# Patient Record
Sex: MALE | Race: WHITE | NOT HISPANIC OR LATINO | Employment: OTHER | URBAN - METROPOLITAN AREA
[De-identification: names, ages, dates, MRNs, and addresses within clinical notes are randomized per-mention and may not be internally consistent; named-entity substitution may affect disease eponyms.]

---

## 2017-06-16 ENCOUNTER — TRANSCRIBE ORDERS (OUTPATIENT)
Dept: ADMINISTRATIVE | Facility: HOSPITAL | Age: 72
End: 2017-06-16

## 2017-06-16 DIAGNOSIS — Z87.891 PERSONAL HISTORY OF TOBACCO USE, PRESENTING HAZARDS TO HEALTH: Primary | ICD-10-CM

## 2017-06-22 ENCOUNTER — HOSPITAL ENCOUNTER (OUTPATIENT)
Dept: ULTRASOUND IMAGING | Facility: HOSPITAL | Age: 72
Discharge: HOME | End: 2017-06-22
Attending: INTERNAL MEDICINE | Admitting: INTERNAL MEDICINE

## 2017-06-22 DIAGNOSIS — Z87.891 PERSONAL HISTORY OF TOBACCO USE, PRESENTING HAZARDS TO HEALTH: ICD-10-CM

## 2017-06-22 PROCEDURE — 76706 US ABDL AORTA SCREEN AAA: CPT

## 2018-04-11 ENCOUNTER — CONSULT (OUTPATIENT)
Dept: ONCOLOGY | Facility: CLINIC | Age: 73
End: 2018-04-11

## 2018-04-11 VITALS
WEIGHT: 171.3 LBS | SYSTOLIC BLOOD PRESSURE: 118 MMHG | TEMPERATURE: 98.4 F | HEART RATE: 72 BPM | RESPIRATION RATE: 18 BRPM | OXYGEN SATURATION: 97 % | DIASTOLIC BLOOD PRESSURE: 77 MMHG | HEIGHT: 73 IN | BODY MASS INDEX: 22.7 KG/M2

## 2018-04-11 DIAGNOSIS — C15.9 ESOPHAGEAL CANCER, STAGE IV (HCC): Primary | ICD-10-CM

## 2018-04-11 PROCEDURE — 99205 OFFICE O/P NEW HI 60 MIN: CPT | Performed by: INTERNAL MEDICINE

## 2018-04-11 RX ORDER — TAMSULOSIN HYDROCHLORIDE 0.4 MG/1
1 CAPSULE ORAL NIGHTLY
COMMUNITY

## 2018-04-11 RX ORDER — PANTOPRAZOLE SODIUM 40 MG/1
40 TABLET, DELAYED RELEASE ORAL DAILY
Status: ON HOLD | COMMUNITY
End: 2018-06-07

## 2018-04-11 RX ORDER — RANITIDINE 150 MG/1
150 TABLET ORAL DAILY
COMMUNITY
End: 2018-06-07 | Stop reason: HOSPADM

## 2018-04-12 RX ORDER — SUCRALFATE ORAL 1 G/10ML
1 SUSPENSION ORAL 4 TIMES DAILY
Qty: 420 ML | Refills: 5 | Status: SHIPPED | OUTPATIENT
Start: 2018-04-12

## 2018-04-12 NOTE — PROGRESS NOTES
ID: 72 y.o. year old male from [facility] [id number]    PCP: Troy Collier MD    REFERRING PHYSICIAN: Dr. Collier    Reason for Consultation: Metastatic Esophageal Adenocarcinoma    Dear Dr. Collier    It is a pleasure to meet Mr. Benitez today.  As you know he is a very unfortunate gentleman who was diagnosed with metastatic esophageal cancer who presents today for consultation for therapy going forward.  He was initially diagnosed by Dr. Beck in October 2017.  The disease was not clinically metastatic at that time.  He initially was seen at [facility] and subsequently decided to pursue treatment at [facility] in [facility].  He was treated with neoadjuvant 5-FU and oxaliplatin along with proton therapy.  He underwent surgical exploration in March 2018 with a plan of resecting him.  Unfortunately at the time of surgery he had implants on the diaphragm consistent with metastatic disease.  At this point they referred him to oncology locally to consider palliative treatment going forward.  Patient is still recovering from his surgery and clinically seems to be doing reasonably well.  He is relatively asymptomatic.  He is able to eat.  Though he has periods of spasms at times.  He denies any significant weight loss recently.  Denies any significant pain issues.  Denies any headaches.  Her primary surgery CAT scan was negative for metastatic disease.      Past Medical History:   Diagnosis Date   • Kidney stones        Past Surgical History:   Procedure Laterality Date   • CATARACT EXTRACTION Bilateral     DR BULMARO MENDOZA   • KNEE SURGERY Left     [facility]   • SHOULDER SURGERY Bilateral     DR REJI MENDOZA       Social History     Social History   • Marital status:      Social History Main Topics   • Smoking status: Former Smoker     Quit date: 1980   • Smokeless tobacco: Never Used   • Alcohol use No   • Drug use: No   • Sexual activity: Defer     Other Topics Concern   • Not on file        Family History   Problem Relation Age of Onset   • Stroke Mother    • Heart attack Father    • Heart attack Sister        Review of Systems:    16 point review of systems was performed and reviewed and scanned into the EMR      Current Outpatient Prescriptions:   •  aspirin 81 MG tablet, Take 81 mg by mouth Daily., Disp: , Rfl:   •  pantoprazole (PROTONIX) 40 MG EC tablet, Take 40 mg by mouth Daily., Disp: , Rfl:   •  raNITIdine (ZANTAC) 150 MG tablet, Take 150 mg by mouth Daily., Disp: , Rfl:   •  tamsulosin (FLOMAX) 0.4 MG capsule 24 hr capsule, Take 1 capsule by mouth Every Night., Disp: , Rfl:     No Known Allergies    ECOG SCORE: 1    Objective     Vitals:    04/11/18 1344   BP: 118/77   Pulse: 72   Resp: 18   Temp: 98.4 °F (36.9 °C)   SpO2: 97%       Physical Exam    General: well appearing, in no acute distress  HEENT: sclera anicteric, oropharynx clear, neck is supple  Lymphatics: no cervical, supraclavicular, or axillary adenopathy  Cardiovascular: regular rate and rhythm, no murmurs, rubs or gallops  Lungs: clear to auscultation bilaterally  Abdomen: soft, nontender, nondistended.  No palpable organomegaly, g-tube in place  Extremities: no lower extremity edema  Skin: no rashes, lesions, bruising, or petechiae  Msk:  Shows no weakness of the large muscle groups  Psych: Mood is stable             ASSESSMENT:    72-year-old gentleman with metastatic adenocarcinoma of the esophagus which appears to be HER-2 positive on FISH.  I spoke to Mr. Benitez and his wife today at length regarding the prognosis of this disease.  He is actually a reasonable candidate for treatment.  I would consider Taxol with Herceptin as my choice of therapy.  This usually is well tolerated with limited side effects.  The other option is 5-FU with CPT-11 & Cyramza however this can be relatively toxic.  These were some of the options that were also considered  [facility].  However the goal of therapy is to palliate the  patient and currently the patient is relatively asymptomatic.  He is also considering foregoing all treatments and seeing how he does going forward.  I have recommended rechecking CAT scan in 1 month and if he has measurable disease then considering treatment with Taxol and Herceptin.  Obviously this is at the discretion of the patient and if he decides to forego therapy nothing that would be a;lso reasonable.  I have encountered some patients who have had an extended remission when they were HER-2 positive.  However that is fairly small number.  Obviously we will weigh the pros and cons of undergoing treatment when we consider starting it.  I will see him back in my clinic in 1 month after CAT scans are performed.  I would not start him on any treatment unless he has measurable disease.      Thank you for allowing me to participate in the care of this patient.    Yours sincerely,    Mirta Tenorio MD  [facility]  Hematology and Oncology        Please note that portions of this note may have been completed with a voice recognition program. Efforts were made to edit the dictations, but occasionally words are mistranscribed.

## 2018-04-12 NOTE — TELEPHONE ENCOUNTER
Carafate called into his pharmacy. I called the pharmacy to see if PA was required. It is not, however co-pay is $60. I called the patient back to make sure this was ok with him. He said he is willing to try it and see if it helps.

## 2018-05-09 ENCOUNTER — HOSPITAL ENCOUNTER (OUTPATIENT)
Dept: CT IMAGING | Facility: HOSPITAL | Age: 73
Discharge: HOME | End: 2018-05-09
Attending: INTERNAL MEDICINE | Admitting: INTERNAL MEDICINE

## 2018-05-09 ENCOUNTER — OFFICE VISIT (OUTPATIENT)
Dept: ONCOLOGY | Facility: CLINIC | Age: 73
End: 2018-05-09

## 2018-05-09 VITALS
OXYGEN SATURATION: 96 % | TEMPERATURE: 98.4 F | HEIGHT: 73 IN | DIASTOLIC BLOOD PRESSURE: 92 MMHG | BODY MASS INDEX: 22.82 KG/M2 | SYSTOLIC BLOOD PRESSURE: 170 MMHG | WEIGHT: 172.2 LBS | RESPIRATION RATE: 18 BRPM | HEART RATE: 72 BPM

## 2018-05-09 DIAGNOSIS — C15.9 ESOPHAGEAL CANCER, STAGE IV (HCC): ICD-10-CM

## 2018-05-09 DIAGNOSIS — C15.9 ESOPHAGEAL CANCER, STAGE IV (HCC): Primary | ICD-10-CM

## 2018-05-09 PROCEDURE — 25010000002 IOPAMIDOL 61 % SOLUTION: Performed by: INTERNAL MEDICINE

## 2018-05-09 PROCEDURE — 74177 CT ABD & PELVIS W/CONTRAST: CPT

## 2018-05-09 PROCEDURE — 63710000001 BARIUM 2 % SUSPENSION: Performed by: INTERNAL MEDICINE

## 2018-05-09 PROCEDURE — 82565 ASSAY OF CREATININE: CPT

## 2018-05-09 PROCEDURE — A9270 NON-COVERED ITEM OR SERVICE: HCPCS | Performed by: INTERNAL MEDICINE

## 2018-05-09 PROCEDURE — 71260 CT THORAX DX C+: CPT

## 2018-05-09 PROCEDURE — 99214 OFFICE O/P EST MOD 30 MIN: CPT | Performed by: INTERNAL MEDICINE

## 2018-05-09 RX ORDER — SENNA PLUS 8.6 MG/1
2 TABLET ORAL EVERY EVENING
COMMUNITY

## 2018-05-09 RX ORDER — ALPRAZOLAM 0.5 MG/1
0.5 TABLET ORAL DAILY
COMMUNITY

## 2018-05-09 RX ADMIN — IOPAMIDOL 90 ML: 612 INJECTION, SOLUTION INTRAVENOUS at 13:24

## 2018-05-09 RX ADMIN — BARIUM SULFATE 450 ML: 21 SUSPENSION ORAL at 12:15

## 2018-05-10 LAB — CREAT BLDA-MCNC: 1.4 MG/DL (ref 0.6–1.3)

## 2018-05-10 NOTE — PROGRESS NOTES
"PROBLEM LIST:    1.  Metastatic adenocarcinoma of the esophagus: Patient was treated with chemotherapy and radiation concurrently at [facility] in December 2017.  2.  Post neoadjuvant therapy esophagectomy was aborted due to metastases noted in the diaphragm.  3.  Dysphasia secondary to likely stricture versus recurrent disease  4.  HER-2 positive by fish      REASON FOR VISIT: The management of my esophageal cancer     HISTORY OF PRESENT ILLNESS:   72 y.o.  male presents today for follow-up of his esophageal cancer.  Since I last saw him clinically he is doing reasonably well.  Unfortunately he still has a fair bit of dysphagia related to narrowing at the distal esophagus.  He presents today after having CAT scans performed.  He still evaluating if he wants to consider further treatment with chemotherapy and not.  Denies any other issues ongoing.  He has occasional pain.  He is fairly functional.    Past medical history, social history and family history was reviewed and unchanged from prior visit.    Review of Systems:    Review of Systems - Oncology   A comprehensive 14 point review of systems was performed and was negative except as mentioned.      Medications:  The current medication list was reviewed in the EMR    ALLERGIES:  No Known Allergies      Physical Exam    VITAL SIGNS:  /92   Pulse 72   Temp 98.4 °F (36.9 °C) (Oral)   Resp 18   Ht 185.4 cm (72.99\")   Wt 78.1 kg (172 lb 3.2 oz)   SpO2 96%   BMI 22.72 kg/m²     Wt Readings from Last 3 Encounters:   05/09/18 78.1 kg (172 lb 3.2 oz)   04/11/18 77.7 kg (171 lb 4.8 oz)        Performance Status: 0    General: well appearing, in no acute distress  HEENT: sclera anicteric, oropharynx clear, neck is supple  Lymphatics: no cervical, supraclavicular, or axillary adenopathy  Cardiovascular: regular rate and rhythm, no murmurs, rubs or gallops  Lungs: clear to auscultation bilaterally  Abdomen: soft, nontender, nondistended.  No palpable " organomegaly, G-Tube in place  Extremities: no lower extremity edema  Skin: no rashes, lesions, bruising, or petechiae  Msk:  Shows no weakness of the large muscle groups  Psych: Mood is stable    Ct Chest With Contrast    Result Date: 5/9/2018  There is thickening of the distal third of the esophagus with luminal narrowing. There is no evidence of mediastinal adenopathy, pericardial/pleural effusion. There are changes in the lung bases which are thought to be chronic postinflammatory changes.  D:  05/09/2018 E:  05/09/2018    This report was finalized on 5/9/2018 2:20 PM by Dr. John Pang MD.      Ct Abdomen Pelvis With Contrast    Result Date: 5/9/2018  There are no malignant findings noted in the abdomen or pelvis. There is a nonspecific moderate amount of pelvic fluid. There is pancolonic diverticulosis and a gastrostomy tube is noted.  D:  05/09/2018 E:  05/09/2018  This report was finalized on 5/9/2018 2:56 PM by Dr. John Pang MD.          Assessment/Plan    1.  Metastatic HER-2 positive esophageal cancer: I reviewed the CAT scans with him and his wife today.  He is still considering if he wants to undergo chemotherapy.  I would treat him with Taxol and Herceptin.    2.  Dysphagia secondary to a distal stricture versus recurrence: I will ask Dr. Beck to see if he can repeat the EGD to consider dilation.    I will see him back in my clinic in 1 month to see how he is doing.    I spent 25 minutes on the patient's plan and care with more than 50% of the time spent counseling the patient.        Mirta Tenorio MD  [facility] Hematology and Oncology    5/10/2018         Please note that portions of this note may have been completed with a voice recognition program. Efforts were made to edit the dictations, but occasionally words are mistranscribed.

## 2018-05-18 ENCOUNTER — TELEPHONE (OUTPATIENT)
Dept: ONCOLOGY | Facility: CLINIC | Age: 73
End: 2018-05-18

## 2018-05-18 NOTE — TELEPHONE ENCOUNTER
----- Message from Lissett Wall sent at 5/18/2018 10:37 AM EDT -----  Regarding: EVI-STINT  Contact: [phone]  Patient called he was referred to Dr. Gong to get a stint, but Dr. Gong doesn't do stints never has got him prepped and everything and when he walked in he said I don't do stints so he referred him to a doctor [facility] Dr. García yesterday and he did the stint. He wanted Dr. Joaquin to know this.

## 2018-05-24 ENCOUNTER — TELEPHONE (OUTPATIENT)
Dept: ONCOLOGY | Facility: CLINIC | Age: 73
End: 2018-05-24

## 2018-05-24 NOTE — TELEPHONE ENCOUNTER
----- Message from Lissett Wall sent at 5/24/2018  1:58 PM EDT -----  Regarding: EVI-HAVING PAIN  Contact: [phone]  Patient called and he is having pain and Dr. Joaquin told him to call if he started having any pain.

## 2018-05-24 NOTE — TELEPHONE ENCOUNTER
Returned call to patient, he reported that he was having intermittent pain in right side dull to sharp pain.  Patient reported that he has hydrocodone elixar for pain but only take once to twice daily.  Patient reports that he just takes when he absolutely needs it and he is concerned about constipation.  Educated on pain management and management of constipation.  Patient verbalized understanding.

## 2018-06-04 ENCOUNTER — APPOINTMENT (OUTPATIENT)
Dept: CT IMAGING | Facility: HOSPITAL | Age: 73
End: 2018-06-04

## 2018-06-04 ENCOUNTER — HOSPITAL ENCOUNTER (INPATIENT)
Facility: HOSPITAL | Age: 73
LOS: 3 days | Discharge: HOME | End: 2018-06-07
Attending: EMERGENCY MEDICINE | Admitting: INTERNAL MEDICINE

## 2018-06-04 ENCOUNTER — APPOINTMENT (OUTPATIENT)
Dept: GENERAL RADIOLOGY | Facility: HOSPITAL | Age: 73
End: 2018-06-04

## 2018-06-04 DIAGNOSIS — C15.9 MALIGNANT NEOPLASM OF ESOPHAGUS, UNSPECIFIED LOCATION (HCC): Primary | ICD-10-CM

## 2018-06-04 DIAGNOSIS — R07.9 CHEST PAIN, UNSPECIFIED TYPE: ICD-10-CM

## 2018-06-04 PROBLEM — K21.9 GERD (GASTROESOPHAGEAL REFLUX DISEASE): Status: ACTIVE | Noted: 2018-06-04

## 2018-06-04 PROBLEM — R13.10 DYSPHAGIA: Status: ACTIVE | Noted: 2018-06-04

## 2018-06-04 LAB
ALBUMIN SERPL-MCNC: 4.2 G/DL (ref 3.2–4.8)
ALBUMIN/GLOB SERPL: 1.4 G/DL (ref 1.5–2.5)
ALP SERPL-CCNC: 94 U/L (ref 25–100)
ALT SERPL W P-5'-P-CCNC: 10 U/L (ref 7–40)
ANION GAP SERPL CALCULATED.3IONS-SCNC: 13 MMOL/L (ref 3–11)
AST SERPL-CCNC: 13 U/L (ref 0–33)
BASOPHILS # BLD AUTO: 0.02 10*3/MM3 (ref 0–0.2)
BASOPHILS NFR BLD AUTO: 0.2 % (ref 0–1)
BILIRUB SERPL-MCNC: 1 MG/DL (ref 0.3–1.2)
BNP SERPL-MCNC: 61 PG/ML (ref 0–100)
BUN BLD-MCNC: 18 MG/DL (ref 9–23)
BUN/CREAT SERPL: 13.8 (ref 7–25)
CALCIUM SPEC-SCNC: 9.6 MG/DL (ref 8.7–10.4)
CHLORIDE SERPL-SCNC: 102 MMOL/L (ref 99–109)
CO2 SERPL-SCNC: 24 MMOL/L (ref 20–31)
CREAT BLD-MCNC: 1.3 MG/DL (ref 0.6–1.3)
DEPRECATED RDW RBC AUTO: 40.9 FL (ref 37–54)
EOSINOPHIL # BLD AUTO: 0.17 10*3/MM3 (ref 0–0.3)
EOSINOPHIL NFR BLD AUTO: 1.8 % (ref 0–3)
ERYTHROCYTE [DISTWIDTH] IN BLOOD BY AUTOMATED COUNT: 13.6 % (ref 11.3–14.5)
GFR SERPL CREATININE-BSD FRML MDRD: 54 ML/MIN/1.73
GLOBULIN UR ELPH-MCNC: 2.9 GM/DL
GLUCOSE BLD-MCNC: 147 MG/DL (ref 70–100)
HCT VFR BLD AUTO: 42.6 % (ref 38.9–50.9)
HGB BLD-MCNC: 14.6 G/DL (ref 13.1–17.5)
HOLD SPECIMEN: NORMAL
HOLD SPECIMEN: NORMAL
IMM GRANULOCYTES # BLD: 0.02 10*3/MM3 (ref 0–0.03)
IMM GRANULOCYTES NFR BLD: 0.2 % (ref 0–0.6)
LIPASE SERPL-CCNC: 24 U/L (ref 6–51)
LYMPHOCYTES # BLD AUTO: 0.71 10*3/MM3 (ref 0.6–4.8)
LYMPHOCYTES NFR BLD AUTO: 7.7 % (ref 24–44)
MCH RBC QN AUTO: 28.9 PG (ref 27–31)
MCHC RBC AUTO-ENTMCNC: 34.3 G/DL (ref 32–36)
MCV RBC AUTO: 84.4 FL (ref 80–99)
MONOCYTES # BLD AUTO: 0.76 10*3/MM3 (ref 0–1)
MONOCYTES NFR BLD AUTO: 8.3 % (ref 0–12)
NEUTROPHILS # BLD AUTO: 7.54 10*3/MM3 (ref 1.5–8.3)
NEUTROPHILS NFR BLD AUTO: 82 % (ref 41–71)
PLATELET # BLD AUTO: 199 10*3/MM3 (ref 150–450)
PMV BLD AUTO: 10.1 FL (ref 6–12)
POTASSIUM BLD-SCNC: 3.6 MMOL/L (ref 3.5–5.5)
PROT SERPL-MCNC: 7.1 G/DL (ref 5.7–8.2)
RBC # BLD AUTO: 5.05 10*6/MM3 (ref 4.2–5.76)
SODIUM BLD-SCNC: 139 MMOL/L (ref 132–146)
TROPONIN I SERPL-MCNC: 0 NG/ML (ref 0–0.07)
WBC NRBC COR # BLD: 9.2 10*3/MM3 (ref 3.5–10.8)
WHOLE BLOOD HOLD SPECIMEN: NORMAL
WHOLE BLOOD HOLD SPECIMEN: NORMAL

## 2018-06-04 PROCEDURE — 99222 1ST HOSP IP/OBS MODERATE 55: CPT | Performed by: PHYSICIAN ASSISTANT

## 2018-06-04 PROCEDURE — 71250 CT THORAX DX C-: CPT

## 2018-06-04 PROCEDURE — 80053 COMPREHEN METABOLIC PANEL: CPT | Performed by: EMERGENCY MEDICINE

## 2018-06-04 PROCEDURE — 25010000002 ONDANSETRON PER 1 MG: Performed by: EMERGENCY MEDICINE

## 2018-06-04 PROCEDURE — 85025 COMPLETE CBC W/AUTO DIFF WBC: CPT | Performed by: EMERGENCY MEDICINE

## 2018-06-04 PROCEDURE — 25010000002 HYDROMORPHONE PER 4 MG: Performed by: HOSPITALIST

## 2018-06-04 PROCEDURE — 99221 1ST HOSP IP/OBS SF/LOW 40: CPT | Performed by: INTERNAL MEDICINE

## 2018-06-04 PROCEDURE — 99284 EMERGENCY DEPT VISIT MOD MDM: CPT

## 2018-06-04 PROCEDURE — 83880 ASSAY OF NATRIURETIC PEPTIDE: CPT | Performed by: EMERGENCY MEDICINE

## 2018-06-04 PROCEDURE — 25010000002 MORPHINE PER 10 MG: Performed by: HOSPITALIST

## 2018-06-04 PROCEDURE — 99223 1ST HOSP IP/OBS HIGH 75: CPT | Performed by: HOSPITALIST

## 2018-06-04 PROCEDURE — 25010000002 ENOXAPARIN PER 10 MG

## 2018-06-04 PROCEDURE — 93005 ELECTROCARDIOGRAM TRACING: CPT | Performed by: EMERGENCY MEDICINE

## 2018-06-04 PROCEDURE — 25010000002 LORAZEPAM PER 2 MG: Performed by: HOSPITALIST

## 2018-06-04 PROCEDURE — 83690 ASSAY OF LIPASE: CPT | Performed by: EMERGENCY MEDICINE

## 2018-06-04 PROCEDURE — 84484 ASSAY OF TROPONIN QUANT: CPT

## 2018-06-04 PROCEDURE — 71045 X-RAY EXAM CHEST 1 VIEW: CPT

## 2018-06-04 PROCEDURE — 25010000002 HYDROMORPHONE PER 4 MG: Performed by: EMERGENCY MEDICINE

## 2018-06-04 RX ORDER — SODIUM CHLORIDE 0.9 % (FLUSH) 0.9 %
10 SYRINGE (ML) INJECTION AS NEEDED
Status: DISCONTINUED | OUTPATIENT
Start: 2018-06-04 | End: 2018-06-07 | Stop reason: HOSPADM

## 2018-06-04 RX ORDER — ASPIRIN 81 MG/1
324 TABLET, CHEWABLE ORAL ONCE
Status: DISCONTINUED | OUTPATIENT
Start: 2018-06-04 | End: 2018-06-04

## 2018-06-04 RX ORDER — DEXTROSE AND SODIUM CHLORIDE 5; .45 G/100ML; G/100ML
125 INJECTION, SOLUTION INTRAVENOUS CONTINUOUS
Status: DISCONTINUED | OUTPATIENT
Start: 2018-06-04 | End: 2018-06-06

## 2018-06-04 RX ORDER — LORAZEPAM 2 MG/ML
0.25 INJECTION INTRAMUSCULAR EVERY 6 HOURS PRN
Status: DISCONTINUED | OUTPATIENT
Start: 2018-06-04 | End: 2018-06-06

## 2018-06-04 RX ORDER — HYDROMORPHONE HYDROCHLORIDE 1 MG/ML
0.5 INJECTION, SOLUTION INTRAMUSCULAR; INTRAVENOUS; SUBCUTANEOUS
Status: COMPLETED | OUTPATIENT
Start: 2018-06-04 | End: 2018-06-04

## 2018-06-04 RX ORDER — DOCUSATE SODIUM 100 MG/1
100 CAPSULE, LIQUID FILLED ORAL 2 TIMES DAILY
Status: DISCONTINUED | OUTPATIENT
Start: 2018-06-04 | End: 2018-06-07 | Stop reason: HOSPADM

## 2018-06-04 RX ORDER — MORPHINE SULFATE 2 MG/ML
1 INJECTION, SOLUTION INTRAMUSCULAR; INTRAVENOUS EVERY 8 HOURS PRN
Status: DISCONTINUED | OUTPATIENT
Start: 2018-06-04 | End: 2018-06-05

## 2018-06-04 RX ORDER — ONDANSETRON 2 MG/ML
4 INJECTION INTRAMUSCULAR; INTRAVENOUS ONCE
Status: COMPLETED | OUTPATIENT
Start: 2018-06-04 | End: 2018-06-04

## 2018-06-04 RX ORDER — PANTOPRAZOLE SODIUM 40 MG/10ML
40 INJECTION, POWDER, LYOPHILIZED, FOR SOLUTION INTRAVENOUS EVERY 12 HOURS SCHEDULED
Status: DISCONTINUED | OUTPATIENT
Start: 2018-06-04 | End: 2018-06-05

## 2018-06-04 RX ORDER — LORAZEPAM 2 MG/ML
1 INJECTION INTRAMUSCULAR ONCE
Status: COMPLETED | OUTPATIENT
Start: 2018-06-04 | End: 2018-06-04

## 2018-06-04 RX ORDER — HYDROCODONE BITARTRATE AND ACETAMINOPHEN 7.5; 325 MG/1; MG/1
1 TABLET ORAL EVERY 6 HOURS PRN
Status: DISCONTINUED | OUTPATIENT
Start: 2018-06-04 | End: 2018-06-06

## 2018-06-04 RX ORDER — HYDROMORPHONE HYDROCHLORIDE 1 MG/ML
0.5 INJECTION, SOLUTION INTRAMUSCULAR; INTRAVENOUS; SUBCUTANEOUS ONCE
Status: COMPLETED | OUTPATIENT
Start: 2018-06-04 | End: 2018-06-04

## 2018-06-04 RX ORDER — FENTANYL 50 UG/H
1 PATCH TRANSDERMAL
Status: DISCONTINUED | OUTPATIENT
Start: 2018-06-04 | End: 2018-06-05

## 2018-06-04 RX ORDER — TAMSULOSIN HYDROCHLORIDE 0.4 MG/1
0.4 CAPSULE ORAL DAILY
Status: DISCONTINUED | OUTPATIENT
Start: 2018-06-04 | End: 2018-06-05

## 2018-06-04 RX ORDER — SUCRALFATE 1 G/1
1 TABLET ORAL
Status: DISCONTINUED | OUTPATIENT
Start: 2018-06-04 | End: 2018-06-07 | Stop reason: HOSPADM

## 2018-06-04 RX ORDER — SODIUM CHLORIDE 0.9 % (FLUSH) 0.9 %
1-10 SYRINGE (ML) INJECTION AS NEEDED
Status: DISCONTINUED | OUTPATIENT
Start: 2018-06-04 | End: 2018-06-07 | Stop reason: HOSPADM

## 2018-06-04 RX ORDER — ALPRAZOLAM 0.5 MG/1
0.5 TABLET ORAL NIGHTLY PRN
Status: DISCONTINUED | OUTPATIENT
Start: 2018-06-04 | End: 2018-06-07 | Stop reason: HOSPADM

## 2018-06-04 RX ADMIN — HYDROMORPHONE HYDROCHLORIDE 0.5 MG: 1 INJECTION, SOLUTION INTRAMUSCULAR; INTRAVENOUS; SUBCUTANEOUS at 12:44

## 2018-06-04 RX ADMIN — POLYETHYLENE GLYCOL (3350) 17 G: 17 POWDER, FOR SOLUTION ORAL at 20:16

## 2018-06-04 RX ADMIN — FENTANYL 1 PATCH: 50 PATCH TRANSDERMAL at 22:30

## 2018-06-04 RX ADMIN — DOCUSATE SODIUM 100 MG: 100 CAPSULE, LIQUID FILLED ORAL at 20:15

## 2018-06-04 RX ADMIN — LIDOCAINE HYDROCHLORIDE: 20 SOLUTION ORAL; TOPICAL at 10:38

## 2018-06-04 RX ADMIN — PANTOPRAZOLE SODIUM 40 MG: 40 INJECTION, POWDER, FOR SOLUTION INTRAVENOUS at 20:15

## 2018-06-04 RX ADMIN — HYDROMORPHONE HYDROCHLORIDE 0.5 MG: 1 INJECTION, SOLUTION INTRAMUSCULAR; INTRAVENOUS; SUBCUTANEOUS at 08:17

## 2018-06-04 RX ADMIN — PANTOPRAZOLE SODIUM 40 MG: 40 INJECTION, POWDER, FOR SOLUTION INTRAVENOUS at 08:16

## 2018-06-04 RX ADMIN — TAMSULOSIN HYDROCHLORIDE 0.4 MG: 0.4 CAPSULE ORAL at 20:15

## 2018-06-04 RX ADMIN — HYDROMORPHONE HYDROCHLORIDE 0.5 MG: 1 INJECTION, SOLUTION INTRAMUSCULAR; INTRAVENOUS; SUBCUTANEOUS at 16:20

## 2018-06-04 RX ADMIN — HYDROMORPHONE HYDROCHLORIDE 0.5 MG: 1 INJECTION, SOLUTION INTRAMUSCULAR; INTRAVENOUS; SUBCUTANEOUS at 05:58

## 2018-06-04 RX ADMIN — DEXTROSE AND SODIUM CHLORIDE 125 ML/HR: 5; 450 INJECTION, SOLUTION INTRAVENOUS at 10:38

## 2018-06-04 RX ADMIN — HYDROMORPHONE HYDROCHLORIDE 0.5 MG: 1 INJECTION, SOLUTION INTRAMUSCULAR; INTRAVENOUS; SUBCUTANEOUS at 17:45

## 2018-06-04 RX ADMIN — ENOXAPARIN SODIUM 40 MG: 100 INJECTION SUBCUTANEOUS at 08:34

## 2018-06-04 RX ADMIN — LORAZEPAM 1 MG: 2 INJECTION INTRAMUSCULAR; INTRAVENOUS at 08:34

## 2018-06-04 RX ADMIN — SUCRALFATE 1 G: 1 TABLET ORAL at 20:15

## 2018-06-04 RX ADMIN — MORPHINE SULFATE 1 MG: 10 INJECTION INTRAVENOUS at 20:16

## 2018-06-04 RX ADMIN — ONDANSETRON 4 MG: 2 INJECTION INTRAMUSCULAR; INTRAVENOUS at 05:57

## 2018-06-04 NOTE — PLAN OF CARE
Problem: Activity Intolerance (Adult)  Goal: Identify Related Risk Factors and Signs and Symptoms  Outcome: Ongoing (interventions implemented as appropriate)   06/04/18 3364   Activity Intolerance (Adult)   Related Risk Factors (Activity Intolerance) generalized weakness;pain   Signs and Symptoms (Activity Intolerance) pain/discomfort

## 2018-06-04 NOTE — H&P
[facility] Medicine Services  HISTORY AND PHYSICAL    Patient Name: Isaak Benitez  : 1945  MRN: [id number]  Primary Care Physician: Troy Collier MD  Primary Oncologist:   Dr. Joaquin    Subjective   Subjective     Chief Complaint:  Chest Pain    HPI:  Isaak Benitez is a 72 y.o. male who has Stage IV Esophageal Cancer and who has had therapy for this at several places.  His primary oncologist referred him to Dr. Beck, who referred the patient to  for GI services.  He had dysphagia due to distal esophageal narrowing/thickening and had a stent placed around May 17th.  Unfortunately, that stent migrated and moved down to the stomach.  He had his most recent esophageal stent placed at [facility] () by Dr. Tin García and it was clipped with 2 clips to prevent migration.   He has had pain since the placement of this stent 4 days ago.  That pain progressed to the point they called the on call service at , but were unsuccessful getting relief and presented here.  The pain has been progressive since this most recent stent placement.  He denies diaphoresis, radiation, nausea and vomiting.  He has not been eating or drinking enough to support himself due to this issue.      He had abdominal surgery and feeding tube placed previously but recently had stenting due to symptomatic dysphagia / esophageal stricture.  The first sent moved/migrated and another stent was placed about 4 days ago with clips.    Review of Systems   Gen- No fevers, chills  CV- No chest pain, palpitations  Resp- No cough, dyspnea  GI- No N/V/D, abd pain    Otherwise 10-system ROS reviewed and is negative except as mentioned in the HPI.    Personal History     Past Medical History:   Diagnosis Date   • Cancer     esophogeal dx'd OCT 2017   • Kidney stones    Last Treatment - Chemo/Radiation (Proton Therapy) at [facility] - 2018  Former Smoker of Cigarettes - started around age 16 yrs - smoked  till late 40s (perhaps for 30 yrs)  PE around Nov - treated with Lovenox for 6 months  Stage IV Esophageal Cancer - treated with surgery, chemo, radiation  GERD  Former Smoker of Cigarettes - probably for 30 yrs    Past Surgical History:   Procedure Laterality Date   • CATARACT EXTRACTION Bilateral     DR BULMARO [place]   • KNEE SURGERY Left     [facility]   • SHOULDER SURGERY Bilateral     DR REJI [place]       Family History: family history includes Heart attack in his father and sister; Stroke in his mother.   Father - Heart Attack  Mother - Stroke    Social History:  reports that he quit smoking about 38 years ago. He has never used smokeless tobacco. He reports that he does not drink alcohol or use drugs.  Social History     Social History Narrative   • No narrative on file     2 kids  Some College   Former Smoker of Cigarettes age 16 - perhaps [age] (perhaps smoker for 30 yrs)  No Alcohol Abuse  No Drug Abuse    Medications:  Xanax 0.5 mg at bedtime  Protonix 40 mg daily  Zantac 150 mg daily  Lortab 7.5 mg oral every 4 hours PRN  Carafate 4 times per day  Senna 2 tabs at bedtime  tamsulosin 0.4 mg     No Known Allergies    Objective   Objective     Vital Signs:   Temp:  [97.7 °F (36.5 °C)] 97.7 °F (36.5 °C)  Heart Rate:  [67-78] 68  Resp:  [16] 16  BP: (143-164)/() 152/99      Physical Exam     Constitutional: No acute distress, awake, alert  Eyes: PERRLA, sclerae anicteric, no conjunctival injection  HENT: NCAT, dry tongue  Neck: Supple, no JVD  Respiratory: poor inspiratory effort, clear grossly  Cardiovascular: RRR, s1 and s2  Gastrointestinal: soft, + tenderness to palpation, upper abd incision, + feeding tube epigastic region  Musculoskeletal: No bilateral ankle edema, no clubbing or cyanosis to extremities  Psychiatric: flat affect  Neurologic: Oriented x 3, generalized weakness  Skin: dry, + skin tenting    Results Reviewed:  I have personally reviewed current lab, radiology, and data and  agree.      Results from last 7 days  Lab Units 06/04/18  0514   WBC 10*3/mm3 9.20   HEMOGLOBIN g/dL 14.6   HEMATOCRIT % 42.6   PLATELETS 10*3/mm3 199       Results from last 7 days  Lab Units 06/04/18  0514   SODIUM mmol/L 139   POTASSIUM mmol/L 3.6   CHLORIDE mmol/L 102   CO2 mmol/L 24.0   BUN mg/dL 18   CREATININE mg/dL 1.30   GLUCOSE mg/dL 147*   CALCIUM mg/dL 9.6   ALT (SGPT) U/L 10   AST (SGOT) U/L 13     Estimated Creatinine Clearance: 56.2 mL/min (by C-G formula based on SCr of 1.3 mg/dL).  Brief Urine Lab Results     None        BNP   Date Value Ref Range Status   06/04/2018 61.0 0.0 - 100.0 pg/mL Final     Comment:     Results may be falsely decreased if patient taking Biotin.     No results found for: PHART  Imaging Results (last 24 hours)     Procedure Component Value Units Date/Time    CT Chest Without Contrast [[id number]] Resulted:  06/04/18 0801     Updated:  06/04/18 0753    XR Chest 1 View [[id number]] Collected:  06/04/18 0506     Updated:  06/04/18 0611    Narrative:       EXAM:    XR Chest, 1 View    EXAM DATE/TIME:    6/4/2018 5:06 AM    CLINICAL HISTORY:    72 years old, male; Pain; Chest pain; Type not specified; Additional info:   Chest pain triage protocol    TECHNIQUE:    Frontal view of the chest.    COMPARISON:    CT CHEST W CONTRAST 2018-05-09 13:15    FINDINGS:    Lungs:  Discoid atelectasis in lung bases. No consolidation or vascular   congestion.    Pleural space:  No large pleural effusion.  No pneumothorax.    Heart:  Unremarkable.  No cardiomegaly.    Mediastinum:  Unremarkable.    Bones/joints:  No acute abnormality.    Tubes, lines and devices:  Percutaneous gastrostomy tube with pigtail at   gastroduodenal junction. Distal esophageal stent.      Impression:         Bibasilar discoid atelectases.    THIS DOCUMENT HAS BEEN ELECTRONICALLY SIGNED BY MAGALI ROB MD        Assessment/Plan   Assessment / Plan     Hospital Problem List     Chest pain    Dysphagia    GERD (gastroesophageal  reflux disease)        72 year old male who has had progressive pain since 2nd Esophageal Stent placement on 5/31 at .    Assessment & Plan:    Stage IV Esophageal Cancer  - s/p chemo/radiation/surgery  - 2 stents have been placed  - the first one migrated down to stomach  - the second one was placed about 4 days ago at  by Dr. Tin García with clips to prevent migration  Dysphagia  - contrast enhanced CT from last month  - distal esophageal narrowing / thickening  Dehydration  - IV fluids  Chronic Kidney Disease  - probable Stage III CKD  Constipation  - bowel regimen on narcotic analgesia  GERD  - on Protonix and Zantac at home  History of PE  - PE Diagnosed in November  - s/p 6 month treatment with Lovneox  Atelectasis  - incentive spirometer    We may want to consider discontinuing Zantac (ranitidine) due to caution in renal impairment  Discussed case with on call Dr. Brunner today while patient in ED    DVT prophylaxis:  Lovenox    CODE STATUS:  No Order    Admission Status:  I believe this patient meets INPATIENT status due to the need for care which can only be reasonably provided in an hospital setting such as aggressive/expedited ancillary services and/or consultation services, the necessity for IV medications, close physician monitoring and/or the possible need for procedures.  In such, I feel patient’s risk for adverse outcomes and need for care warrant INPATIENT evaluation and predict the patient’s care encounter to likely last beyond 2 midnights.    Electronically signed by Tj Garcia MD, 06/04/18, 8:02 AM.

## 2018-06-04 NOTE — CONSULTS
[facility]    Referring Provider: Tj Garcia MD   PCP: Troy Collier MD    Reason for Consultation: Chest pain, esophageal stent     Chief complaint: Chest pain     History of present illness:    Isaak Benitez is a 72 y.o. male who is admitted with epigastric and mid sternal chest pain.  He has an unfortunate history of Stage IV esophageal cancer.  He was initially treated with chemotherapy and radiation at [facility].  He underwent surgical exploration in March 2018 with plans for resection but he was found to have metastatic disease.  He is now followed by Dr. Tenorio for palliative treatment.  He had an esophageal stent placed on 5/17/18 at [facility] for esophageal stricture.  This migrated and was removed.  He had a 23 mm x 10.5 cm Wall Flex partially covered stent with 2 clips placed on 5/31/18 by Dr. Tin García.  The patient reports that since the procedure he has had significant epigastric and mid sternal chest pain.      Allergies:  Patient has no known allergies.    Scheduled Meds:    docusate sodium 100 mg Oral BID   enoxaparin 40 mg Subcutaneous Q24H   pantoprazole 40 mg Intravenous Q12H   polyethylene glycol 17 g Oral BID   sucralfate 1 g Oral 4x Daily AC & at Bedtime   tamsulosin 0.4 mg Oral Daily        Infusions:    dextrose 5 % and sodium chloride 0.45 % 125 mL/hr Last Rate: 125 mL/hr (06/04/18 1038)   Pharmacy to Dose enoxaparin (LOVENOX)         PRN Meds:  •  ALPRAZolam  •  HYDROcodone-acetaminophen  •  HYDROmorphone  •  HYDROmorphone  •  LORazepam  •  Morphine  •  Pharmacy to Dose enoxaparin (LOVENOX)  •  sodium chloride  •  sodium chloride    Home Meds:  Prescriptions Prior to Admission   Medication Sig Dispense Refill Last Dose   • ALPRAZolam (XANAX) 0.5 MG tablet Take 0.5 mg by mouth Daily.   Taking   • HYDROCODONE-ACETAMINOPHEN PO Take 7.5 mg by mouth Every 4 (Four) Hours As Needed. 7.5/325 mg pt to take 7.5 ml - 15ml      • pantoprazole  (PROTONIX) 40 MG EC tablet Take 40 mg by mouth Daily.   Taking   • raNITIdine (ZANTAC) 150 MG tablet Take 150 mg by mouth Daily.   Taking   • tamsulosin (FLOMAX) 0.4 MG capsule 24 hr capsule Take 1 capsule by mouth Every Night.   Taking   • aspirin 81 MG tablet Take 81 mg by mouth Daily.   Taking   • senna (SENOKOT) 8.6 MG tablet Take 2 tablets by mouth Every Evening.   Taking   • sucralfate (CARAFATE) 1 GM/10ML suspension Take 10 mL by mouth 4 (Four) Times a Day. (Patient not taking: Reported on 6/4/2018) 420 mL 5 Not Taking at Unknown time       ROS: Review of Systems   Constitutional: Positive for fatigue and unexpected weight change.   HENT: Positive for trouble swallowing.    Eyes: Negative.    Cardiovascular: Positive for chest pain.   Gastrointestinal: Positive for abdominal pain.   Endocrine: Negative.    Genitourinary: Negative.    Musculoskeletal: Negative.    Skin: Negative.    Neurological: Positive for weakness.   Hematological: Negative.    Psychiatric/Behavioral: Negative.        PAST MED HX:  Past Medical History:   Diagnosis Date   • Cancer     esophogeal dx'd OCT 2017   • Kidney stones        PAST SURG HX:  Past Surgical History:   Procedure Laterality Date   • CATARACT EXTRACTION Bilateral     DR BULMARO [place]   • KNEE SURGERY Left     [facility]   • SHOULDER SURGERY Bilateral     DR REJI [place]       FAM HX:  Family History   Problem Relation Age of Onset   • Stroke Mother    • Heart attack Father    • Heart attack Sister        SOC HX:  Social History     Social History   • Marital status:      Spouse name: N/A   • Number of children: N/A   • Years of education: N/A     Occupational History   • Not on file.     Social History Main Topics   • Smoking status: Former Smoker     Quit date: 1980   • Smokeless tobacco: Never Used   • Alcohol use No   • Drug use: No   • Sexual activity: Defer     Other Topics Concern   • Not on file     Social History Narrative   • No narrative on file  "      PHYSICAL EXAM  /96   Pulse 69   Temp 98.3 °F (36.8 °C) (Oral)   Resp 18   Ht 185.4 cm (73\")   Wt 77.5 kg (170 lb 13.7 oz)   SpO2 96%   BMI 22.54 kg/m²   Wt Readings from Last 3 Encounters:   06/04/18 77.5 kg (170 lb 13.7 oz)   05/09/18 78.1 kg (172 lb 3.2 oz)   04/11/18 77.7 kg (171 lb 4.8 oz)   ,body mass index is 22.54 kg/m².  Physical Exam   Constitutional: He is oriented to person, place, and time. He appears well-developed. No distress.   Neck: Normal range of motion.   Cardiovascular: Normal rate and regular rhythm.    Pulmonary/Chest: Effort normal and breath sounds normal.   Abdominal: Soft. Bowel sounds are normal. There is no tenderness.   G-tube clean without drainage    Musculoskeletal: Normal range of motion.   Neurological: He is alert and oriented to person, place, and time.   Skin: Skin is warm and dry. He is not diaphoretic.   Psychiatric: He has a normal mood and affect. His behavior is normal.       Results Review:   I reviewed the patient's new clinical results.    Lab Results   Component Value Date    WBC 9.20 06/04/2018    HGB 14.6 06/04/2018    HCT 42.6 06/04/2018    MCV 84.4 06/04/2018     06/04/2018       No results found for: INR    Lab Results   Component Value Date    GLUCOSE 147 (H) 06/04/2018    BUN 18 06/04/2018    CREATININE 1.30 06/04/2018    EGFRIFNONA 54 (L) 06/04/2018    BCR 13.8 06/04/2018    CO2 24.0 06/04/2018    CALCIUM 9.6 06/04/2018    ALBUMIN 4.20 06/04/2018    ALKPHOS 94 06/04/2018    BILITOT 1.0 06/04/2018    ALT 10 06/04/2018    AST 13 06/04/2018     CT chest - esophageal stent and gastrostomy tube     ASSESSMENTS/PLANS    1. Esophageal stricture, s/p recent Wallflex partially covered stent (on 5/31/18 at [facility])  2. Epigastric and chest pain  3. Stage IV Esophageal cancer     The stent appears to be in place on CT.  It is unclear at this time if this stent can be removed.  Dr. Brunner will discuss with the rep and physician.  >>> " Recommend pain control and IV fluids.  Nutritional supplements through G-tube     I discussed the patients findings and my recommendations with patient    BRODY Dye  06/04/18  3:14 PM

## 2018-06-04 NOTE — PROGRESS NOTES
Discharge Planning Assessment  [facility]     Patient Name: Isaak Benitez  MRN: [id number]  Today's Date: 6/4/2018    Admit Date: 6/4/2018          Discharge Needs Assessment     Row Name 06/04/18 1033       Living Environment    Lives With spouse    Name(s) of Who Lives With Patient Amanda Benitez, spouse    Current Living Arrangements home/apartment/condo    Primary Care Provided by spouse/significant other;self    Provides Primary Care For no one    Family Caregiver if Needed spouse    Family Caregiver Names Horace Benitez, spouse     Quality of Family Relationships helpful;involved;supportive    Able to Return to Prior Arrangements yes       Resource/Environmental Concerns    Resource/Environmental Concerns none       Transition Planning    Patient/Family Anticipates Transition to home with family    Patient/Family Anticipated Services at Transition none    Transportation Anticipated family or friend will provide       Discharge Needs Assessment    Readmission Within the Last 30 Days no previous admission in last 30 days    Concerns to be Addressed no discharge needs identified;denies needs/concerns at this time    Equipment Currently Used at Home none    Anticipated Changes Related to Illness none    Equipment Needed After Discharge none            Discharge Plan     Row Name 06/04/18 1034       Plan    Plan home    Patient/Family in Agreement with Plan yes    Plan Comments Spoke with patient and wife at bedside regarding discharge planning.  Patient denies use of HH or DME.  Patient reports that she has prescription coverage.  Patient lives with his wife in a multilevel house with ground level access.  Patient denies home safety concerns.  Patient denies needs at home, has feeding tube but indicates that they have the feedings and supplies all worked out and do not need any assistance at this time.  CM following.  Patient plan is to discharge home via car with family to transport when medically ready.      Final Discharge Disposition Code 01 - home or self-care        Destination     No service coordination in this encounter.      Durable Medical Equipment     No service coordination in this encounter.      Dialysis/Infusion     No service coordination in this encounter.      Home Medical Care     No service coordination in this encounter.      Social Care     No service coordination in this encounter.        Expected Discharge Date and Time     Expected Discharge Date Expected Discharge Time    Jun 5, 2018               Demographic Summary     Row Name 06/04/18 1032       General Information    Admission Type inpatient    Arrived From home    Referral Source admission list    Reason for Consult discharge planning    Preferred Language English     Used During This Interaction no    General Information Comments Troy Collier MD       Contact Information    Permission Granted to Share Info With     Contact Information Obtained for     Contact Information Comments Amanda Benitez, spouse  [phone]            Functional Status     Row Name 06/04/18 1032       Functional Status    Usual Activity Tolerance good    Current Activity Tolerance moderate       Functional Status, IADL    Medications independent    Meal Preparation independent    Housekeeping independent    Laundry independent    Shopping independent       Employment/    Employment/ Comments Medicare/[employer]            Psychosocial    No documentation.           Abuse/Neglect    No documentation.           Legal    No documentation.           Substance Abuse    No documentation.           Patient Forms    No documentation.         Lissett Cuadra RN

## 2018-06-04 NOTE — CONSULTS
REFERRING PHYSICIAN: Dr. Tj Garcia    PRIMARY CARE PROVIDER: Troy Collier MD    REASON FOR CONSULTATION: Chest pain      HISTORY OF PRESENT ILLNESS:  72-year-old gentleman with metastatic esophageal cancer recently underwent stent placement at [facility] last week.  Now presents with significant pain with spasms for the last several days.  No significant relief.  So he presented to the ER for pain management and further workup.  His metastatic disease was revealed when he underwent laparotomy for possible esophagectomy.  He was found to have disease in the diaphragm.  He was having significant issues with swallowing and so we sent him to [facility] for stent placement.  The first stent was unsuccessful which fell into his stomach surgery he had to undergo a repeat procedure since then he's had significant pain.      No Known Allergies    Past Medical History:   Diagnosis Date   • Cancer     esophogeal dx'd OCT 2017   • Kidney stones          Current Facility-Administered Medications:   •  ALPRAZolam (XANAX) tablet 0.5 mg, 0.5 mg, Oral, Nightly PRN, Tj Garcia MD  •  dextrose 5 % and sodium chloride 0.45 % infusion, 125 mL/hr, Intravenous, Continuous, Tj Garcia MD, Last Rate: 125 mL/hr at 06/04/18 1038, 125 mL/hr at 06/04/18 1038  •  docusate sodium (COLACE) capsule 100 mg, 100 mg, Oral, BID, Tj Garcia MD  •  enoxaparin (LOVENOX) syringe 40 mg, 40 mg, Subcutaneous, Q24H, Dann Garcia, [facility], 40 mg at 06/04/18 0834  •  HYDROcodone-acetaminophen (NORCO) 7.5-325 MG per tablet 1 tablet, 1 tablet, Oral, Q6H PRN, Tj Garcia MD  •  HYDROmorphone (DILAUDID) injection 0.5 mg, 0.5 mg, Intravenous, Q10 Min PRN, Neto Beluga, DO, 0.5 mg at 06/04/18 0558  •  HYDROmorphone (DILAUDID) injection 0.5 mg, 0.5 mg, Intravenous, Q10 Min PRN, Neto Beluga, DO, 0.5 mg at 06/04/18 1244  •  LORazepam (ATIVAN) injection 0.25 mg, 0.25 mg, Intravenous, Q6H PRN, Tj Garcia MD  •  Morphine sulfate  "(PF) injection 1 mg, 1 mg, Intravenous, Q8H PRN, Tj Garcia MD  •  pantoprazole (PROTONIX) injection 40 mg, 40 mg, Intravenous, Q12H, Tj Garcia MD, 40 mg at 06/04/18 0816  •  Pharmacy to Dose enoxaparin (LOVENOX), , Does not apply, Continuous PRN, Tj Garcia MD  •  polyethylene glycol 3350 powder (packet), 17 g, Oral, BID, Tj Garcia MD  •  sodium chloride 0.9 % flush 1-10 mL, 1-10 mL, Intravenous, PRN, Tj Garcia MD  •  sodium chloride 0.9 % flush 10 mL, 10 mL, Intravenous, PRN, Neto Fox DO  •  sucralfate (CARAFATE) tablet 1 g, 1 g, Oral, 4x Daily AC & at Bedtime, Tj Garcia MD  •  tamsulosin (FLOMAX) 24 hr capsule 0.4 mg, 0.4 mg, Oral, Daily, Tj Garcia MD    Past Surgical History:   Procedure Laterality Date   • CATARACT EXTRACTION Bilateral     DR BULMARO [place]OTT [place]   • KNEE SURGERY Left     [facility]   • SHOULDER SURGERY Bilateral     DR REJI [place]       Social History     Social History   • Marital status:      Social History Main Topics   • Smoking status: Former Smoker     Quit date: 1980   • Smokeless tobacco: Never Used   • Alcohol use No   • Drug use: No   • Sexual activity: Defer     Other Topics Concern   • Not on file       Family History   Problem Relation Age of Onset   • Stroke Mother    • Heart attack Father    • Heart attack Sister          REVIEW OF SYSTEMS:  A 14 point review of systems was performed and is negative except as noted below.    Review of Systems - Oncology      Objective     Vitals:    06/04/18 0700 06/04/18 0701 06/04/18 0745 06/04/18 0810   BP: 152/99  148/92 161/96   BP Location:       Patient Position:       Pulse:  68 68 69   Resp:   18    Temp:   98.3 °F (36.8 °C)    TempSrc:   Oral    SpO2: 94% 94% 94% 96%   Weight:    77.5 kg (170 lb 13.7 oz)   Height:    185.4 cm (73\")       Temp:  [97.7 °F (36.5 °C)-98.3 °F (36.8 °C)] 98.3 °F (36.8 °C)     Performance Status: 1    Physical Exam    General: well appearing male in pain  HEENT: sclera " anicteric, oropharynx clear  Lymphatics: no cervical, supraclavicular, or axillary adenopathy  Cardiovascular: regular rate and rhythm, no murmurs  Lungs: clear to auscultation bilaterally  Abdomen: soft, nontender, nondistended.  No palpable organomegaly  Extremeties: no lower extremity edema  Skin: no rashes, lesions, bruising, or petechiae      LABS:    Lab Results   Component Value Date    HGB 14.6 06/04/2018    HCT 42.6 06/04/2018    MCV 84.4 06/04/2018     06/04/2018    WBC 9.20 06/04/2018    NEUTROABS 7.54 06/04/2018    LYMPHSABS 0.71 06/04/2018    MONOSABS 0.76 06/04/2018    EOSABS 0.17 06/04/2018    BASOSABS 0.02 06/04/2018     Lab Results   Component Value Date    GLUCOSE 147 (H) 06/04/2018    BUN 18 06/04/2018    CREATININE 1.30 06/04/2018     06/04/2018    K 3.6 06/04/2018     06/04/2018    CO2 24.0 06/04/2018    CALCIUM 9.6 06/04/2018    PROTEINTOT 7.1 06/04/2018    ALBUMIN 4.20 06/04/2018    BILITOT 1.0 06/04/2018    ALKPHOS 94 06/04/2018    AST 13 06/04/2018    ALT 10 06/04/2018         IMAGING    Ct Chest Without Contrast    Result Date: 6/4/2018  EXAMINATION: CT CHEST WO CONTRAST-06/04/2018:  INDICATION: Recent esophageal stent placement.  Significant CP since placement.; C15.9-Malignant neoplasm of esophagus, unspecified; R07.9-Chest pain, unspecified.  TECHNIQUE: Noncontrast CT scan of the chest.  The radiation dose reduction device was turned on for each scan per the ALARA (As Low as Reasonably Achievable) protocol.  COMPARISON: 05/09/2018.  FINDINGS: Esophageal stent and gastrostomy tube are visualized. There are two 1 cm long densities at the proximal aspect of the esophageal stent. There are small bilateral pleural effusions. No mediastinal or pleural air is seen. There is dependent atelectasis adjacent to the effusions. There is no focal airspace disease, noncalcified nodule or mass. The central airways are patent. The heart size is within normal limits. Atherosclerotic  vascular calcifications are seen. No adenopathy is identified within the limits of a noncontrast scan. Incidental imaging of the upper abdomen shows a small amount of perihepatic free fluid and bilateral renal hypodensities most likely representing cysts.      1.  Two metallic densities possibly lodged at the proximal stent or, alternatively, may be part of the stent itself. 2. Small effusions.  D:  06/04/2018 E:  06/04/2018    This report was finalized on 6/4/2018 2:50 PM by Eugene Hunt.      Ct Chest With Contrast    Result Date: 5/9/2018  EXAMINATION: CT CHEST W CONTRAST-05/09/2018:  INDICATION: Esophageal cancer; C15.9-Malignant neoplasm of esophagus, unspecified.     TECHNIQUE: CT scan of the chest was performed without intravenous contrast.  The radiation dose reduction device was turned on for each scan per the ALARA (As Low as Reasonably Achievable) protocol.  COMPARISON: NONE.  FINDINGS: There is no axillary lymphadenopathy. There is no mediastinal or hilar adenopathy. There is no pericardial effusion. There is thickening of the esophagus with luminal narrowing involving the distal one-third. Images displayed at lung window settings demonstrate minimal posterior bilateral pleural thickening and postinflammatory change in the posterior segments of the lower lobes.      There is thickening of the distal third of the esophagus with luminal narrowing. There is no evidence of mediastinal adenopathy, pericardial/pleural effusion. There are changes in the lung bases which are thought to be chronic postinflammatory changes.  D:  05/09/2018 E:  05/09/2018    This report was finalized on 5/9/2018 2:20 PM by Dr. John Pang MD.      Ct Abdomen Pelvis With Contrast    Result Date: 5/9/2018  EXAMINATION: CT ABDOMEN PELVIS W CONTRAST- 05/09/2018  INDICATION: esophageal cancer; C15.9-Malignant neoplasm of esophagus, unspecified     TECHNIQUE: CT scan of the abdomen and pelvis was performed following intravenous contrast.   The radiation dose reduction device was turned on for each scan per the ALARA (As Low as Reasonably Achievable) protocol.  COMPARISON: NONE  FINDINGS:  Gastrostomy tube is noted. The liver and spleen are normal. There is no adrenal mass. There are bilateral simple renal cysts, larger on the left than on the right. There is no solid renal mass or obstruction. Pancreas is normal. There is no ascites, aneurysm or retroperitoneal lymphadenopathy. The terminal ileum is normal. There is no pelvic mass. There is a moderate amount of pelvic fluid. There are sigmoid diverticuli without diverticulitis.      There are no malignant findings noted in the abdomen or pelvis. There is a nonspecific moderate amount of pelvic fluid. There is pancolonic diverticulosis and a gastrostomy tube is noted.  D:  05/09/2018 E:  05/09/2018  This report was finalized on 5/9/2018 2:56 PM by Dr. John Pang MD.      Xr Chest 1 View    Result Date: 6/4/2018  EXAM:   XR Chest, 1 View EXAM DATE/TIME:   6/4/2018 5:06 AM CLINICAL HISTORY:   72 years old, male; Pain; Chest pain; Type not specified; Additional info: Chest pain triage protocol TECHNIQUE:   Frontal view of the chest. COMPARISON:   CT CHEST W CONTRAST 2018-05-09 13:15 FINDINGS:   Lungs:  Discoid atelectasis in lung bases. No consolidation or vascular congestion.   Pleural space:  No large pleural effusion.  No pneumothorax.   Heart:  Unremarkable.  No cardiomegaly.   Mediastinum:  Unremarkable.   Bones/joints:  No acute abnormality.   Tubes, lines and devices:  Percutaneous gastrostomy tube with pigtail at gastroduodenal junction. Distal esophageal stent.       Bibasilar discoid atelectases. THIS DOCUMENT HAS BEEN ELECTRONICALLY SIGNED BY MAGALI ROB MD      ASSESSMENT/PLAN:    1.  Chest pain: I suspect he is having spasms from stretching of the esophagus.  Gastroenterology has been consult it does evaluate the patient.  Hopefully this subsides in the next several days.    2.  Metastatic  esophageal cancer: Patient does have HER-2 positivity.  I have given him a choice of chemotherapy with Taxol and Herceptin.  He wants to consider that in the next month.  He may opt for palliative care rather than treatment.    Supportive care for now from my standpoint.    Mirta Tenorio MD    6/4/2018      Please note that portions of this note may have been completed with a voice recognition program. Efforts were made to edit the dictations, but occasionally words are mistranscribed.

## 2018-06-05 LAB
BILIRUB UR QL STRIP: NEGATIVE
CLARITY UR: CLEAR
COLOR UR: YELLOW
GLUCOSE UR STRIP-MCNC: NEGATIVE MG/DL
HGB UR QL STRIP.AUTO: NEGATIVE
KETONES UR QL STRIP: NEGATIVE
LEUKOCYTE ESTERASE UR QL STRIP.AUTO: NEGATIVE
NITRITE UR QL STRIP: NEGATIVE
PH UR STRIP.AUTO: 7.5 [PH] (ref 5–8)
PROT UR QL STRIP: NEGATIVE
SP GR UR STRIP: 1.01 (ref 1–1.03)
UROBILINOGEN UR QL STRIP: NORMAL

## 2018-06-05 PROCEDURE — 81003 URINALYSIS AUTO W/O SCOPE: CPT | Performed by: EMERGENCY MEDICINE

## 2018-06-05 PROCEDURE — 25010000002 PROMETHAZINE PER 50 MG: Performed by: HOSPITALIST

## 2018-06-05 PROCEDURE — 25010000002 ENOXAPARIN PER 10 MG

## 2018-06-05 PROCEDURE — 25010000003 HYDROMORPHONE PER 4 MG: Performed by: HOSPITALIST

## 2018-06-05 PROCEDURE — 25010000002 LORAZEPAM PER 2 MG: Performed by: HOSPITALIST

## 2018-06-05 PROCEDURE — 25010000002 MORPHINE SULFATE (PF) 2 MG/ML SOLUTION: Performed by: HOSPITALIST

## 2018-06-05 PROCEDURE — 99232 SBSQ HOSP IP/OBS MODERATE 35: CPT | Performed by: PHYSICIAN ASSISTANT

## 2018-06-05 PROCEDURE — 25010000002 HYDROMORPHONE PER 4 MG: Performed by: NURSE PRACTITIONER

## 2018-06-05 PROCEDURE — 99233 SBSQ HOSP IP/OBS HIGH 50: CPT | Performed by: HOSPITALIST

## 2018-06-05 RX ORDER — HYDROMORPHONE HYDROCHLORIDE 1 MG/ML
0.5 INJECTION, SOLUTION INTRAMUSCULAR; INTRAVENOUS; SUBCUTANEOUS
Status: DISCONTINUED | OUTPATIENT
Start: 2018-06-05 | End: 2018-06-05

## 2018-06-05 RX ORDER — HYDROMORPHONE HYDROCHLORIDE 1 MG/ML
0.5 INJECTION, SOLUTION INTRAMUSCULAR; INTRAVENOUS; SUBCUTANEOUS ONCE
Status: COMPLETED | OUTPATIENT
Start: 2018-06-05 | End: 2018-06-05

## 2018-06-05 RX ORDER — NALOXONE HCL 0.4 MG/ML
0.1 VIAL (ML) INJECTION
Status: DISCONTINUED | OUTPATIENT
Start: 2018-06-05 | End: 2018-06-07 | Stop reason: HOSPADM

## 2018-06-05 RX ORDER — MORPHINE SULFATE 2 MG/ML
1 INJECTION, SOLUTION INTRAMUSCULAR; INTRAVENOUS
Status: DISCONTINUED | OUTPATIENT
Start: 2018-06-05 | End: 2018-06-05

## 2018-06-05 RX ORDER — TAMSULOSIN HYDROCHLORIDE 0.4 MG/1
0.4 CAPSULE ORAL NIGHTLY
Status: DISCONTINUED | OUTPATIENT
Start: 2018-06-05 | End: 2018-06-07 | Stop reason: HOSPADM

## 2018-06-05 RX ORDER — PROMETHAZINE HYDROCHLORIDE 25 MG/ML
6.25 INJECTION, SOLUTION INTRAMUSCULAR; INTRAVENOUS EVERY 6 HOURS PRN
Status: DISCONTINUED | OUTPATIENT
Start: 2018-06-05 | End: 2018-06-06

## 2018-06-05 RX ORDER — PANTOPRAZOLE SODIUM 40 MG/1
40 TABLET, DELAYED RELEASE ORAL EVERY 12 HOURS SCHEDULED
Status: DISCONTINUED | OUTPATIENT
Start: 2018-06-05 | End: 2018-06-07 | Stop reason: HOSPADM

## 2018-06-05 RX ADMIN — LORAZEPAM 0.25 MG: 2 INJECTION INTRAMUSCULAR; INTRAVENOUS at 00:29

## 2018-06-05 RX ADMIN — DEXTROSE AND SODIUM CHLORIDE 125 ML/HR: 5; 450 INJECTION, SOLUTION INTRAVENOUS at 03:29

## 2018-06-05 RX ADMIN — HYDROMORPHONE HYDROCHLORIDE: 10 INJECTION INTRAMUSCULAR; INTRAVENOUS; SUBCUTANEOUS at 19:44

## 2018-06-05 RX ADMIN — PANTOPRAZOLE SODIUM 40 MG: 40 INJECTION, POWDER, FOR SOLUTION INTRAVENOUS at 08:53

## 2018-06-05 RX ADMIN — POLYETHYLENE GLYCOL (3350) 17 G: 17 POWDER, FOR SOLUTION ORAL at 08:53

## 2018-06-05 RX ADMIN — DEXTROSE AND SODIUM CHLORIDE 125 ML/HR: 5; 450 INJECTION, SOLUTION INTRAVENOUS at 19:43

## 2018-06-05 RX ADMIN — ENOXAPARIN SODIUM 40 MG: 100 INJECTION SUBCUTANEOUS at 08:53

## 2018-06-05 RX ADMIN — MORPHINE SULFATE 1 MG: 10 INJECTION INTRAVENOUS at 03:33

## 2018-06-05 RX ADMIN — PROMETHAZINE HYDROCHLORIDE 6.25 MG: 25 INJECTION INTRAMUSCULAR; INTRAVENOUS at 12:36

## 2018-06-05 RX ADMIN — PANTOPRAZOLE SODIUM 40 MG: 40 TABLET, DELAYED RELEASE ORAL at 21:09

## 2018-06-05 RX ADMIN — HYDROMORPHONE HYDROCHLORIDE 0.5 MG: 1 INJECTION, SOLUTION INTRAMUSCULAR; INTRAVENOUS; SUBCUTANEOUS at 00:42

## 2018-06-05 RX ADMIN — HYDROMORPHONE HYDROCHLORIDE: 10 INJECTION INTRAMUSCULAR; INTRAVENOUS; SUBCUTANEOUS at 09:04

## 2018-06-05 RX ADMIN — TAMSULOSIN HYDROCHLORIDE 0.4 MG: 0.4 CAPSULE ORAL at 21:24

## 2018-06-05 RX ADMIN — DOCUSATE SODIUM 100 MG: 100 CAPSULE, LIQUID FILLED ORAL at 08:53

## 2018-06-05 NOTE — PLAN OF CARE
Problem: Pain, Chronic (Adult)  Goal: Identify Related Risk Factors and Signs and Symptoms  Outcome: Ongoing (interventions implemented as appropriate)   06/05/18 0318   Pain, Chronic (Adult)   Related Risk Factors (Chronic Pain) pain control inadequate   Signs and Symptoms (Chronic Pain) sleep pattern change     Goal: Acceptable Pain/Comfort Level and Functional Ability  Outcome: Ongoing (interventions implemented as appropriate)   06/05/18 0318   Pain, Chronic (Adult)   Acceptable Pain/Comfort Level and Functional Ability making progress toward outcome

## 2018-06-05 NOTE — PROGRESS NOTES
"GI Daily Progress Note  Subjective:    Chief Complaint:  Chest pain     Chest pain is improved today on IV Dilaudid PCA.  He did have an episode of emesis prior to breakfast this morning.  Has not had a bowel movement in three days.      Objective:    /96   Pulse 66   Temp 97.9 °F (36.6 °C) (Oral)   Resp 18   Ht 185.4 cm (73\")   Wt 77.5 kg (170 lb 13.7 oz)   SpO2 94%   BMI 22.54 kg/m²     Physical Exam   Cardiovascular: Normal rate and regular rhythm.    Pulmonary/Chest: Effort normal and breath sounds normal.   Abdominal: Soft. Bowel sounds are normal. He exhibits no distension. There is no tenderness.   Neurological: He is alert.   Patient falls asleep during part of interview    Skin: Skin is warm and dry.   Nursing note and vitals reviewed.      Lab  Lab Results   Component Value Date    WBC 9.20 06/04/2018    HGB 14.6 06/04/2018    MCV 84.4 06/04/2018     06/04/2018       Lab Results   Component Value Date    GLUCOSE 147 (H) 06/04/2018    BUN 18 06/04/2018    CREATININE 1.30 06/04/2018    EGFRIFNONA 54 (L) 06/04/2018    BCR 13.8 06/04/2018    CO2 24.0 06/04/2018    CALCIUM 9.6 06/04/2018    ALBUMIN 4.20 06/04/2018    ALKPHOS 94 06/04/2018    BILITOT 1.0 06/04/2018    ALT 10 06/04/2018    AST 13 06/04/2018       Assessment:    1. Esophageal stricture, s/p recent Wallflex partially covered stent (on 5/31/18 at [facility])  2. Epigastric and chest pain  3. Stage IV Esophageal cancer   4. Opiate induced constipation     Plan:    >>> Will start Relistor for constipation  >>> BID PPI indefinitely   >>> If emesis persists, will need EGD.  Will make NPO at midnight tonight and reassess in the morning     BRODY Dye  06/05/18  2:53 PM    "

## 2018-06-05 NOTE — CONSULTS
"Nutrition Services    Patient Name:  Isaak Benitez  YOB: 1945  MRN: [id number]  Admit Date:  6/4/2018    Clinical Nutrition   Reason For Visit: MST score 2+    Patient Name: Isaak Benitez  YOB: 1945  MRN: [id number]  Date of Encounter: 06/04/18 9:40 PM  Admission date: 6/4/2018      Comments: Pt will use PEG for suppl nutrition w Boost Plus and begin to take soft food. Note ordered as \"mashable\" to assure less difficulty w esophageal issues.         Nutrition Assessment     Hospital Problem List  Active Problems:    Chest pain    Dysphagia    GERD (gastroesophageal reflux disease)    Malignant neoplasm of esophagus - Stage IV     Other Applicable Diagnosis: Dehydration on adm; Atelectasis; Constipation from chr narc; CKD III; Hx PE Note pt w esophageal stent that migrated into stomach; now w new stent that had been assoc w pain PTA.     Reported/Observed/Food/Nutrition Related History     Pt rpt uses PEG for supplemental feeding       Anthropometrics   Height: 73 in  Weight: 170 lb  BMI: 22.54  BMI classification: Normal: 18.5-24.9kg/m2       Labs reviewed   Labs reviewed: Yes        Medications reviewed   Medications reviewed: Yes        Current Nutrition Prescription   PO: Diet Soft Texture; Chopped    Evaluation of Received Nutrient/Fluid Intake: Just begun at time of visit      Nutrition Diagnosis     Problem Potential Suboptimal Intake   Etiology Recent pain assoc w esophageal stent   Signs/Symptoms Poor intake or NPO > 5 days         Goal:   General: Nutrition support treatment  PO: Establish PO  EN/PN: Maintain EN as at home    Intervention: Follow treatment progress, Care plan reviewed, Advise alternate selection, Menu adjusted, Supplement provided  Ordered Boost Plus 3x/da for supplemental feeding.    Monitoring/Evaluation:       Monitoring/Evaluation: Per protocol, PO intake, Supplement intake, Symptoms  Determine wt/intake hx next visit.     Marivel Lemos RD  Time Spent: " 30 min    Electronically signed by:  Marivel Lemos RD  06/04/18 9:39 PM

## 2018-06-05 NOTE — PROGRESS NOTES
Nutrition Services    Patient Name:  Isaak Benitez  YOB: 1945  MRN: [id number]  Admit Date:  6/4/2018    Clinical Nutrition   Reason For Visit: Follow-up protocol, Malnutrition Severity Assessment    Patient Name: Isaak Benitez  YOB: 1945  MRN: [id number]  Date of Encounter: 06/05/18 5:16 PM  Admission date: 6/4/2018      Comments: Based on wt/intake hx from pt and moderate wasting, pt meets criteria for moderate chronic malnutrition Pt allows not using suppl feed at this time and taking little food. Pending GI eval may wish to consider passive feeding.      Nutrition Assessment     Hospital Problem List  Active Problems:    Chest pain    Dysphagia    GERD (gastroesophageal reflux disease)    Malignant neoplasm of esophagus Stage IV      Other Applicable Diagnosis: Dehydration on adm; Atelectasis; Constipation from chr narc; CKD III; Hx PE Note pt w esophageal stent that migrated into stomach; now w new stent that had been assoc w pain PTA.       Reported/Observed/Food/Nutrition Related History     Pt rpt usual wt was 189 lbs 6 mo ago. Allows now not using suppl and eating little.        Anthropometrics   Weight change:  Loss of 19 lbs (10%) wt loss over 6 mo.       Nutrition Focused Physical Exam     Subcutaneous fat:  Orbital No fat loss identified   Triceps Mild   Thoracic and lumbar region- ribs lower back, midaxillary line Mild     Muscle:  Temple/temporalis No muscle loss identified   Clavicle/acromion- pectoralis, deltoid, trapezius Mild   Scapular bone region Moderate   Dorsal hand Mild   Quadriceps Moderate   Calf Moderate         Labs reviewed   Labs reviewed: Yes        Medications reviewed   Medications reviewed: Yes  Pertinent:    Medical tests/Procedures since admission:          Current Nutrition Prescription   PO: GI Soft chopped - note to be mashable  Nutrition Supplement: Boost Plus 3/da    Evaluation of Received Nutrient/Fluid Intake: Insufficient  data      Nutrition Diagnosis     Problem Potential Suboptimal Intake   Etiology Recent pain assoc w esophageal stent   Signs/Symptoms Poor intake or NPO > 5 days      Problem                         Malnutrition  Etiology                          Poor intake due to esophageal CA, pain  Signs/Symptoms           Wt loss of 10% over 6 mo w wasting          Goal:   General: Nutrition support treatment  PO: Increase intake    Intervention: Follow treatment progress, Care plan reviewed, Advise alternate selection, Encourage intake      Monitoring/Evaluation:       Monitoring/Evaluation: Per protocol, PO intake, Supplement intake, GI status, Symptoms  Will Continue to follow per protocol  Marivel Lemos RD  Time Spent: 30 min    Electronically signed by:  Marivel Lemos RD  06/05/18 5:16 PM

## 2018-06-05 NOTE — PROGRESS NOTES
Malnutrition Severity Assessment    Patient Name:  Isaak Benitez  YOB: 1945  MRN: [id number]  Admit Date:  6/4/2018    Patient meets criteria for : Moderate malnutrition (Based on wt/intake hx and muscle wasting pt meets criteria for moderate chronic malnutrition)    Comments:    Malnutrition Type: Chronic Illness Malnutrition     Malnutrition Type (last 8 hours)      Malnutrition Severity Assessment     Row Name 06/05/18 1731       Malnutrition Severity Assessment    Malnutrition Type Chronic Illness Malnutrition    Row Name 06/05/18 1731       Physical Signs of Malnutrition (Chronic)    Muscle Wasting --   moderate    Fat Loss Mild    Row Name 06/05/18 1731       Weight Status (Chronic)    Weight Loss Mod (10% / 6 mo)    Row Name 06/05/18 1731       Energy Intake Status (Chronic)    Energy Intake Severe (< or equal to 50% / > or equal to 1 mo)    Row Name 06/05/18 1731       Criteria Met (Must meet criteria for severity in at least 2 of these categories: M Wasting, Fat Loss, Fluid, Secondary Signs, Wt. Status, Intake)    Patient meets criteria for  Moderate malnutrition   Based on wt/intake hx and muscle wasting pt meets criteria for moderate chronic malnutrition          Electronically signed by:  Marivel Lemos RD  06/05/18 5:32 PM

## 2018-06-05 NOTE — PLAN OF CARE
Problem: Activity Intolerance (Adult)  Goal: Identify Related Risk Factors and Signs and Symptoms  Outcome: Ongoing (interventions implemented as appropriate)   06/05/18 0318   Activity Intolerance (Adult)   Related Risk Factors (Activity Intolerance) functional decline   Signs and Symptoms (Activity Intolerance) pain/discomfort     Goal: Activity Tolerance  Outcome: Ongoing (interventions implemented as appropriate)   06/05/18 0318   Activity Intolerance (Adult)   Activity Tolerance making progress toward outcome     Goal: Effective Energy Conservation Techniques  Outcome: Ongoing (interventions implemented as appropriate)   06/05/18 0318   Activity Intolerance (Adult)   Effective Energy Conservation Techniques making progress toward outcome

## 2018-06-05 NOTE — PROGRESS NOTES
[facility]  PROGRESS NOTE    Patient Name: Isaak Benitez  : 1945  MRN: [id number]    Date of Admission: 2018  Length of Stay: 1  Primary Care Physician: Troy Collier MD    Subjective   Subjective     CC:  Pain since Esophageal Stenting    HPI:  Called today about pain being uncontrolled on combination of IV and oral medication.  Inquiring about Dilaudid PCA    Review of Systems    Gen- No fevers, chills  CV- No chest pain, palpitations  Resp- No cough, dyspnea  GI- No N/V/D, abd pain    Otherwise ROS is negative except as mentioned in the HPI.    Objective   Objective     Vital Signs:   Temp:  [97.9 °F (36.6 °C)-99.2 °F (37.3 °C)] 97.9 °F (36.6 °C)  Heart Rate:  [66-76] 66  Resp:  [16-18] 18  BP: (148-153)/(94-97) 153/96     Physical Exam:    Constitutional: No acute distress, awake, alert, on Dilaudid PCA  Eyes: PERRLA, sclerae anicteric, no conjunctival injection  HENT: NCAT, dry tongue  Neck: Supple, no JVD  Respiratory: poor inspiratory effort, clear grossly  Cardiovascular: RRR, s1 and s2  Gastrointestinal: soft, + tenderness to palpation, upper abd incision, + feeding tube epigastic region  Musculoskeletal: No bilateral ankle edema, no clubbing or cyanosis to extremities  Psychiatric: flat affect  Neurologic: Oriented x 3, generalized weakness  Skin: dry, + skin tenting    Results Reviewed:  I have personally reviewed current lab, radiology, and data and agree.      Results from last 7 days  Lab Units 18  0514   WBC 10*3/mm3 9.20   HEMOGLOBIN g/dL 14.6   HEMATOCRIT % 42.6   PLATELETS 10*3/mm3 199       Results from last 7 days  Lab Units 18  0514   SODIUM mmol/L 139   POTASSIUM mmol/L 3.6   CHLORIDE mmol/L 102   CO2 mmol/L 24.0   BUN mg/dL 18   CREATININE mg/dL 1.30   GLUCOSE mg/dL 147*   CALCIUM mg/dL 9.6   ALT (SGPT) U/L 10   AST (SGOT) U/L 13     Estimated Creatinine Clearance: 57.1 mL/min (by C-G formula based on SCr of 1.3  mg/dL).  BNP   Date Value Ref Range Status   06/04/2018 61.0 0.0 - 100.0 pg/mL Final     Comment:     Results may be falsely decreased if patient taking Biotin.     No results found for: PHART    Microbiology Results Abnormal     None          Imaging Results (last 24 hours)     ** No results found for the last 24 hours. **        I have reviewed the medications.    Assessment/Plan   Assessment / Plan     Hospital Problem List     Chest pain    Dysphagia    GERD (gastroesophageal reflux disease)    Malignant neoplasm of esophagus        Brief Hospital Course to date:  Isaak Benitez is a 72 y.o. male with stage IV Esophageal Cancer who has had pain since his 2nd esophageal stenting on May 31 (approx 5 days ago)     Assessment & Plan:    Stage IV Esophageal Cancer  - s/p chemo/radiation/surgery  - 2 stents have been placed  - the first one migrated down to stomach  - the second one was placed about 5 days ago at  by Dr. Tin García with clips to prevent migration  Dysphagia  - contrast enhanced CT from last month  - distal esophageal narrowing / thickening  - CT Chest (non contrast enhanced) on admission - stent with clips  Dehydration  - IV fluids  Chronic Kidney Disease  - probable Stage III CKD  Constipation  - bowel regimen on narcotic analgesia  GERD  - on Protonix and Zantac at home  History of PE  - PE Diagnosed in November  - s/p 6 month treatment with Lovneox  Atelectasis  - incentive spirometer    GI Consulted  Started Dilaudid PCA today    DVT Prophylaxis:  Lovenox SC    CODE STATUS: Full Code    Disposition: I expect the patient to be discharged TBD      Electronically signed by Tj Garcia MD, 06/05/18, 4:38 PM.

## 2018-06-05 NOTE — PLAN OF CARE
Problem: Activity Intolerance (Adult)  Goal: Identify Related Risk Factors and Signs and Symptoms  Outcome: Ongoing (interventions implemented as appropriate)   06/05/18 1421   Activity Intolerance (Adult)   Related Risk Factors (Activity Intolerance) pain   Signs and Symptoms (Activity Intolerance) pain/discomfort       Problem: Pain, Chronic (Adult)  Goal: Identify Related Risk Factors and Signs and Symptoms  Outcome: Ongoing (interventions implemented as appropriate)   06/05/18 1421   Pain, Chronic (Adult)   Related Risk Factors (Chronic Pain) pain control inadequate   Signs and Symptoms (Chronic Pain) fatigue/weakness

## 2018-06-06 PROCEDURE — 25010000002 ONDANSETRON PER 1 MG: Performed by: INTERNAL MEDICINE

## 2018-06-06 PROCEDURE — 25010000002 ENOXAPARIN PER 10 MG

## 2018-06-06 PROCEDURE — 99232 SBSQ HOSP IP/OBS MODERATE 35: CPT | Performed by: INTERNAL MEDICINE

## 2018-06-06 PROCEDURE — 99232 SBSQ HOSP IP/OBS MODERATE 35: CPT | Performed by: PHYSICIAN ASSISTANT

## 2018-06-06 PROCEDURE — 25010000003 HYDROMORPHONE PER 4 MG: Performed by: HOSPITALIST

## 2018-06-06 RX ORDER — TRAZODONE HYDROCHLORIDE 50 MG/1
50 TABLET ORAL NIGHTLY
Status: DISCONTINUED | OUTPATIENT
Start: 2018-06-06 | End: 2018-06-07 | Stop reason: HOSPADM

## 2018-06-06 RX ORDER — MORPHINE SULFATE 30 MG/1
15 TABLET ORAL EVERY 4 HOURS PRN
Status: DISCONTINUED | OUTPATIENT
Start: 2018-06-06 | End: 2018-06-06

## 2018-06-06 RX ORDER — ONDANSETRON 4 MG/1
4 TABLET, FILM COATED ORAL EVERY 6 HOURS PRN
Status: DISCONTINUED | OUTPATIENT
Start: 2018-06-06 | End: 2018-06-07 | Stop reason: HOSPADM

## 2018-06-06 RX ORDER — FENTANYL 50 UG/H
1 PATCH TRANSDERMAL
Status: DISCONTINUED | OUTPATIENT
Start: 2018-06-07 | End: 2018-06-07

## 2018-06-06 RX ORDER — HALOPERIDOL 1 MG/1
0.5 TABLET ORAL EVERY 6 HOURS PRN
Status: DISCONTINUED | OUTPATIENT
Start: 2018-06-06 | End: 2018-06-07 | Stop reason: HOSPADM

## 2018-06-06 RX ORDER — ONDANSETRON 2 MG/ML
4 INJECTION INTRAMUSCULAR; INTRAVENOUS EVERY 6 HOURS PRN
Status: DISCONTINUED | OUTPATIENT
Start: 2018-06-06 | End: 2018-06-07 | Stop reason: HOSPADM

## 2018-06-06 RX ORDER — OXYCODONE HYDROCHLORIDE 5 MG/1
10 TABLET ORAL
Status: DISCONTINUED | OUTPATIENT
Start: 2018-06-06 | End: 2018-06-07 | Stop reason: HOSPADM

## 2018-06-06 RX ORDER — BACLOFEN 10 MG/1
5 TABLET ORAL EVERY 8 HOURS PRN
Status: DISCONTINUED | OUTPATIENT
Start: 2018-06-06 | End: 2018-06-07 | Stop reason: HOSPADM

## 2018-06-06 RX ORDER — MORPHINE SULFATE 30 MG/1
15 TABLET ORAL
Status: DISCONTINUED | OUTPATIENT
Start: 2018-06-06 | End: 2018-06-07 | Stop reason: HOSPADM

## 2018-06-06 RX ORDER — SODIUM CHLORIDE 9 MG/ML
50 INJECTION, SOLUTION INTRAVENOUS CONTINUOUS
Status: ACTIVE | OUTPATIENT
Start: 2018-06-06 | End: 2018-06-06

## 2018-06-06 RX ORDER — SENNOSIDES 8.6 MG
2 TABLET ORAL NIGHTLY
Status: DISCONTINUED | OUTPATIENT
Start: 2018-06-06 | End: 2018-06-07 | Stop reason: HOSPADM

## 2018-06-06 RX ADMIN — OXYCODONE HYDROCHLORIDE 10 MG: 5 TABLET ORAL at 12:28

## 2018-06-06 RX ADMIN — MORPHINE SULFATE 15 MG: 30 TABLET ORAL at 16:21

## 2018-06-06 RX ADMIN — ALPRAZOLAM 0.5 MG: 0.5 TABLET ORAL at 20:25

## 2018-06-06 RX ADMIN — PANTOPRAZOLE SODIUM 40 MG: 40 TABLET, DELAYED RELEASE ORAL at 20:17

## 2018-06-06 RX ADMIN — MORPHINE SULFATE 15 MG: 30 TABLET ORAL at 20:18

## 2018-06-06 RX ADMIN — BACLOFEN 5 MG: 10 TABLET ORAL at 20:18

## 2018-06-06 RX ADMIN — DEXTROSE AND SODIUM CHLORIDE 125 ML/HR: 5; 450 INJECTION, SOLUTION INTRAVENOUS at 05:27

## 2018-06-06 RX ADMIN — POLYETHYLENE GLYCOL (3350) 17 G: 17 POWDER, FOR SOLUTION ORAL at 09:51

## 2018-06-06 RX ADMIN — SENNOSIDES 17.2 MG: 8.6 TABLET ORAL at 20:19

## 2018-06-06 RX ADMIN — PANTOPRAZOLE SODIUM 40 MG: 40 TABLET, DELAYED RELEASE ORAL at 09:51

## 2018-06-06 RX ADMIN — HYDROMORPHONE HYDROCHLORIDE: 10 INJECTION INTRAMUSCULAR; INTRAVENOUS; SUBCUTANEOUS at 04:41

## 2018-06-06 RX ADMIN — TRAZODONE HYDROCHLORIDE 50 MG: 50 TABLET, FILM COATED ORAL at 20:26

## 2018-06-06 RX ADMIN — ONDANSETRON 4 MG: 2 INJECTION INTRAMUSCULAR; INTRAVENOUS at 14:25

## 2018-06-06 RX ADMIN — DOCUSATE SODIUM 100 MG: 100 CAPSULE, LIQUID FILLED ORAL at 20:17

## 2018-06-06 RX ADMIN — SODIUM CHLORIDE 50 ML/HR: 9 INJECTION, SOLUTION INTRAVENOUS at 10:46

## 2018-06-06 RX ADMIN — DOCUSATE SODIUM 100 MG: 100 CAPSULE, LIQUID FILLED ORAL at 09:51

## 2018-06-06 RX ADMIN — ENOXAPARIN SODIUM 40 MG: 100 INJECTION SUBCUTANEOUS at 09:51

## 2018-06-06 RX ADMIN — TAMSULOSIN HYDROCHLORIDE 0.4 MG: 0.4 CAPSULE ORAL at 20:17

## 2018-06-06 NOTE — PLAN OF CARE
"Problem: Patient Care Overview  Goal: Interprofessional Rounds/Family Conf  Outcome: Ongoing (interventions implemented as appropriate)  Palliative Team Conference: ANN-AMRIE Gutierrez RN, PN; LUZ MARIA Bridges, JEANNETTE, [facility]; KENRICK Patrick DO; SIMONE Whaley, APRN; LIZZY Martinez RN, CHPN; APRIL Cardenas MDiv   06/06/18 1508   Interdisciplinary Rounds/Family Conf   Summary New Palliative consult for pain management, to transition to regimen sustainable at home. Pt has been on Fentanyl patch and PCA, today reported ptain 5/10 constant with intermittent/occasional \"spasm\" in esophagus that is 9/10 and lasts 10-15 seconds. Pt insists that he is going home tomorrow; Palliative APRN transitioned to PO meds with range for dose-finding. Pt referral [facility] to [facility]; will provide contact information to pt to schedule appointment after discharge.         "

## 2018-06-06 NOTE — PROGRESS NOTES
Continued Stay Note  [facility]     Patient Name: Isaak Benitez  MRN: [id number]  Today's Date: 6/6/2018    Admit Date: 6/4/2018          Discharge Plan     Row Name 06/06/18 1006       Plan    Plan update    Patient/Family in Agreement with Plan yes    Plan Comments Spoke with patient at bedside regarding discharge plan.  Patient hoping to go home soon.  No discharge needs noted at this time.  Patient plan is to discharge home via car with family to transport when medically ready.     Final Discharge Disposition Code 01 - home or self-care              Discharge Codes    No documentation.       Expected Discharge Date and Time     Expected Discharge Date Expected Discharge Time    Jun 7, 2018             Lissett Cuadra RN

## 2018-06-06 NOTE — PLAN OF CARE
Problem: Activity Intolerance (Adult)  Goal: Identify Related Risk Factors and Signs and Symptoms  Outcome: Ongoing (interventions implemented as appropriate)   06/06/18 1322   Activity Intolerance (Adult)   Related Risk Factors (Activity Intolerance) pain   Signs and Symptoms (Activity Intolerance) pain/discomfort       Problem: Pain, Chronic (Adult)  Goal: Identify Related Risk Factors and Signs and Symptoms  Outcome: Ongoing (interventions implemented as appropriate)   06/06/18 1322   Pain, Chronic (Adult)   Related Risk Factors (Chronic Pain) pain control inadequate   Signs and Symptoms (Chronic Pain) fatigue/weakness;questions meaning of pain

## 2018-06-06 NOTE — PLAN OF CARE
Problem: Activity Intolerance (Adult)  Goal: Activity Tolerance  Outcome: Ongoing (interventions implemented as appropriate)   06/06/18 0520   Activity Intolerance (Adult)   Activity Tolerance making progress toward outcome       Problem: Pain, Chronic (Adult)  Goal: Acceptable Pain/Comfort Level and Functional Ability  Outcome: Ongoing (interventions implemented as appropriate)   06/06/18 0520   Pain, Chronic (Adult)   Acceptable Pain/Comfort Level and Functional Ability making progress toward outcome

## 2018-06-06 NOTE — CONSULTS
"Troy Collier MD  Consulting physician: Shawn Jiang  Reason for referral : goals of care and symptom management  Chief Complaint   Patient presents with   • Chest Pain     HPI:  73 yo male with stage IV esophageal cancer who has had worsening pain since his second esophageal stenting on 5/31 at [facility]. Patient presented to ED on 6/4/18 with worsening pain. First stent that was placed migrated to the stomach, second one was placed with clips to prevent migration.   Evaluated by GI and deferring EGD since patient tolerating po and no emesis.     Symptoms:   When asked are you uncomfortable because of pain, patient responded yes.   Reports having pain in epigastric area, \"dull\" 4/10 currently. Constant.   Also reports esophageal spasm 9/10 at worst. Intermittent last seconds to minutes.   \"knife stabbing\".   + lower abdomen \"soreness\" below abd incision. + allodynia  Pain with clothes against his abdomen or light touch.   Left flank pain.   PCA: patient has received cumulative does of 6mg over the last 19 hours. Patient reports that he was not pushing the button when he was in pain. He has not pushed the button in the last two hours prior to my visit to the room.   No nausea, no anxiety. Reports some dyspnea associated with esophageal spasms.   Reports some discomfort with sleeping, unable to raise bed, have tried pillows and wedge pillow at home with not much success.     Advance care planning discussed: no  Code Status: full  Advance Directive: reviewed directive on medical record  Surrogate decision maker: spouse, Amanda  Past Medical History:   Diagnosis Date   • Cancer     esophogeal dx'd OCT 2017   • Kidney stones      Past Surgical History:   Procedure Laterality Date   • CATARACT EXTRACTION Bilateral     DR BULMARO SCOTT [place]   • KNEE SURGERY Left     [facility]   • SHOULDER SURGERY Bilateral     DR REJI SCOTT [place]       Reviewed current scheduled and prn medications for route, type, dose " and frequency.    Current Facility-Administered Medications   Medication Dose Route Frequency Provider Last Rate Last Dose   • ALPRAZolam (XANAX) tablet 0.5 mg  0.5 mg Oral Nightly PRN Tj Garcia MD       • docusate sodium (COLACE) capsule 100 mg  100 mg Oral BID Tj Garcia MD   100 mg at 06/06/18 0951   • enoxaparin (LOVENOX) syringe 40 mg  40 mg Subcutaneous Q24H Dann Garcia [facility]   40 mg at 06/06/18 0951   • HYDROcodone-acetaminophen (NORCO) 7.5-325 MG per tablet 1 tablet  1 tablet Oral Q6H PRN Tj Garcia MD       • HYDROmorphone (DILAUDID) PCA 0.1 mg/mL 30 mL syringe   Intravenous Continuous Tj Garcia MD       • LORazepam (ATIVAN) injection 0.25 mg  0.25 mg Intravenous Q6H PRN Tj Garcia MD   0.25 mg at 06/05/18 0029   • naloxone (NARCAN) injection 0.1 mg  0.1 mg Intravenous Q5 Min PRN Tj Garcia MD       • pantoprazole (PROTONIX) EC tablet 40 mg  40 mg Oral Q12H ELODIA Flor   40 mg at 06/06/18 0951   • Pharmacy to Dose enoxaparin (LOVENOX)   Does not apply Continuous PRN Tj Garcia MD       • polyethylene glycol 3350 powder (packet)  17 g Oral BID Tj Garcia MD   17 g at 06/06/18 0951   • promethazine (PHENERGAN) injection 6.25 mg  6.25 mg Intravenous Q6H PRN Tj Garcia MD   6.25 mg at 06/05/18 1236   • sodium chloride 0.9 % flush 1-10 mL  1-10 mL Intravenous PRN Tj Garcia MD       • sodium chloride 0.9 % flush 10 mL  10 mL Intravenous PRN Neto Fox DO       • sodium chloride 0.9 % infusion  50 mL/hr Intravenous Continuous Alexandro Jiang MD       • sucralfate (CARAFATE) tablet 1 g  1 g Oral 4x Daily AC & at Bedtime Tj Garcia MD   1 g at 06/04/18 2015   • tamsulosin (FLOMAX) 24 hr capsule 0.4 mg  0.4 mg Oral Nightly Tj Garcia MD   0.4 mg at 06/05/18 2124       HYDROmorphone    Pharmacy to Dose enoxaparin (LOVENOX)    sodium chloride 50 mL/hr     •  ALPRAZolam  •  HYDROcodone-acetaminophen  •  LORazepam  •  naloxone  •  Pharmacy to Dose  "enoxaparin (LOVENOX)  •  promethazine  •  sodium chloride  •  sodium chloride  No Known Allergies  Family History   Problem Relation Age of Onset   • Stroke Mother    • Heart attack Father    • Heart attack Sister      Social History     Social History   • Marital status:      Spouse name: N/A   • Number of children: N/A   • Years of education: N/A     Occupational History   • Not on file.     Social History Main Topics   • Smoking status: Former Smoker     Quit date: 1980   • Smokeless tobacco: Never Used   • Alcohol use No   • Drug use: No   • Sexual activity: Defer     Other Topics Concern   • Not on file     Social History Narrative   • No narrative on file     Review of Systems - +/- per HPI and symptom review.    PPS:  50%  /94 (BP Location: Right arm, Patient Position: Lying)   Pulse 77   Temp 98.1 °F (36.7 °C) (Oral)   Resp 16   Ht 185.4 cm (73\")   Wt 77.5 kg (170 lb 13.7 oz)   SpO2 94%   BMI 22.54 kg/m²   77.5 kg (170 lb 13.7 oz) Body mass index is 22.54 kg/m².  Intake & Output (last day)       06/05 0701 - 06/06 0700 06/06 0701 - 06/07 0700    P.O.  350    Other 150     NG/     Total Intake(mL/kg) 674 (8.7) 350 (4.5)    Urine (mL/kg/hr) 950 (0.5) 300 (1)    Total Output 950 300    Net -276 +50              Physical Exam:  General Appearance: No acute distress, ill appearing, up in chair  Head: Normocephalic without obvious abnormality, atraumatic  Eyes: Conjunctivae and sclerae normal, no icterus, ZULEMA  Throat: No oral lesions, no thrush, oral mucosa moist  Neck: No adenopathy, supple trachea, midline  Lungs: Clear to auscultation, respirations regular, even and non-labored  Heart: Regular rhythm and normal rate, normal S1  Abdomen: Normal bowel sounds, soft, non-distended, well healed abd surgical scar  Extremities: Moves all extremities, no redness, no cyanosis, no edema  Pulses: Distal pulses palpable and equal bilaterally  Skin: Warm and dry  Neurological: Alert, interactive, " appropriate conversation, no myoclonus, equal hand  and strength    Reviewed labs and diagnostic results.  No results found for: HGBA1C    Results from last 7 days  Lab Units 06/04/18  0514   WBC 10*3/mm3 9.20   HEMOGLOBIN g/dL 14.6   HEMATOCRIT % 42.6   PLATELETS 10*3/mm3 199       Results from last 7 days  Lab Units 06/04/18  0514   SODIUM mmol/L 139   POTASSIUM mmol/L 3.6   CHLORIDE mmol/L 102   CO2 mmol/L 24.0   BUN mg/dL 18   CREATININE mg/dL 1.30   CALCIUM mg/dL 9.6   BILIRUBIN mg/dL 1.0   ALK PHOS U/L 94   ALT (SGPT) U/L 10   AST (SGOT) U/L 13   GLUCOSE mg/dL 147*       Results from last 7 days  Lab Units 06/04/18  0514   SODIUM mmol/L 139   POTASSIUM mmol/L 3.6   CHLORIDE mmol/L 102   CO2 mmol/L 24.0   BUN mg/dL 18   CREATININE mg/dL 1.30   GLUCOSE mg/dL 147*   CALCIUM mg/dL 9.6     Imaging Results (last 72 hours)     Procedure Component Value Units Date/Time    CT Chest Without Contrast [[id number]] Collected:  06/04/18 1041     Updated:  06/04/18 1452    Narrative:       EXAMINATION: CT CHEST WO CONTRAST-06/04/2018:      INDICATION: Recent esophageal stent placement.  Significant CP since  placement.; C15.9-Malignant neoplasm of esophagus, unspecified;  R07.9-Chest pain, unspecified.      TECHNIQUE: Noncontrast CT scan of the chest.     The radiation dose reduction device was turned on for each scan per the  ALARA (As Low as Reasonably Achievable) protocol.     COMPARISON: 05/09/2018.     FINDINGS: Esophageal stent and gastrostomy tube are visualized. There  are two 1 cm long densities at the proximal aspect of the esophageal  stent. There are small bilateral pleural effusions. No mediastinal or  pleural air is seen. There is dependent atelectasis adjacent to the  effusions. There is no focal airspace disease, noncalcified nodule or  mass. The central airways are patent. The heart size is within normal  limits. Atherosclerotic vascular calcifications are seen. No adenopathy  is identified within the  limits of a noncontrast scan. Incidental  imaging of the upper abdomen shows a small amount of perihepatic free  fluid and bilateral renal hypodensities most likely representing cysts.       Impression:       1.  Two metallic densities possibly lodged at the proximal stent or,  alternatively, may be part of the stent itself.  2. Small effusions.     D:  06/04/2018  E:  06/04/2018           This report was finalized on 6/4/2018 2:50 PM by Eugene Hunt.       XR Chest 1 View [[id number]] Collected:  06/04/18 0506     Updated:  06/04/18 0611    Narrative:       EXAM:    XR Chest, 1 View    EXAM DATE/TIME:    6/4/2018 5:06 AM    CLINICAL HISTORY:    72 years old, male; Pain; Chest pain; Type not specified; Additional info:   Chest pain triage protocol    TECHNIQUE:    Frontal view of the chest.    COMPARISON:    CT CHEST W CONTRAST 2018-05-09 13:15    FINDINGS:    Lungs:  Discoid atelectasis in lung bases. No consolidation or vascular   congestion.    Pleural space:  No large pleural effusion.  No pneumothorax.    Heart:  Unremarkable.  No cardiomegaly.    Mediastinum:  Unremarkable.    Bones/joints:  No acute abnormality.    Tubes, lines and devices:  Percutaneous gastrostomy tube with pigtail at   gastroduodenal junction. Distal esophageal stent.      Impression:         Bibasilar discoid atelectases.    THIS DOCUMENT HAS BEEN ELECTRONICALLY SIGNED BY MAGALI ROB MD        Impression: 72 y.o. male with esophageal cancer, stage IV  Plan:   Epigastric pain - patient is wanting to go home tomorrow.   Stop hydromorphone pca.   Continue fentanyl TD patch 50mcg/hour. Applied initially on 6/4 around 2200.   Start prn po opioid. Patient had been taking hydrocodone/APAP 15mg at home without much relief. Patient would prefer to use po tablet instead of elixir.   Will provide morphine ER 15mg or oxy IR 10mg available to assess tolerance/effectiveness.     Esophageal spasms - started trazodone with multiple effect, SSRI and 5HT2/alpha  1 adrenergic antagonists. Added bedtime, hopefully will assist with discomfort at night.   Prn baclofen, haloperidol.  Patient did not tolerate GI cocktail.     Plan - improved symptom management with maintaining functional status.   Recommend follow up with outpt palliative medicine, Dr Claudio. Information provided for follow up appointment.     Eulalia SOLIZ, #[id number]. No discrepancies    ELODIA Hagen  [phone]  06/06/18  10:43 AM      Time: 75 minutes spent reviewing medical and medication records, assessing and examining patient, discussing with patient, family and nursing staff, answering questions, formulating a plan and documentation of care. > 50% time spent face to face

## 2018-06-06 NOTE — PLAN OF CARE
Problem: Palliative Care (Adult)  Intervention: Minimize Discomfort   06/06/18 1505   Promote Oxygenation/Perfusion   Pain Management Interventions pain management plan reviewed with patient/caregiver;see MAR;around-the-clock dosing utilized     Intervention: Optimize Function   06/06/18 1505   Musculoskeletal Interventions   Fatigue Management paced activity encouraged     Intervention: Promote Informed Decision Making and Goal Setting   06/06/18 1505   Coping/Psychosocial Interventions   Life Transition/Adjustment palliative care discussed;palliative care initiated     Intervention: Support/Optimize Psychosocial Response   06/06/18 1505   Coping/Psychosocial Interventions   Supportive Measures self-responsibility promoted;self-care encouraged       Goal: Identify Related Risk Factors and Signs and Symptoms  Outcome: Ongoing (interventions implemented as appropriate)   06/06/18 1505   Palliative Care (Adult)   Palliative Care: Related Risk Factors condition is progressive;terminal illness;worsening symptoms   Palliative Care: Signs and Symptoms anxiety;despair;fatigue;loss of appetite;pain     Goal: Maximized Comfort  Outcome: Ongoing (interventions implemented as appropriate)   06/06/18 1505   Palliative Care (Adult)   Maximized Comfort making progress toward outcome     Goal: Enhanced Quality of Life  Outcome: Ongoing (interventions implemented as appropriate)   06/06/18 1505   Palliative Care (Adult)   Enhanced Quality of Life making progress toward outcome

## 2018-06-06 NOTE — PROGRESS NOTES
"GI Daily Progress Note  Subjective:    Chief Complaint:  Chest pain    Patient is feeling some better today.  He ate part of dinner last night and is slowly eating part of his breakfast.  No recurrent emesis.  Still with intermittent spasms.  He is wanting to go home soon.  Had two bowel movements yesterday     Objective:    /94 (BP Location: Right arm, Patient Position: Lying)   Pulse 69   Temp 98.1 °F (36.7 °C) (Oral)   Resp 16   Ht 185.4 cm (73\")   Wt 77.5 kg (170 lb 13.7 oz)   SpO2 94%   BMI 22.54 kg/m²     Physical Exam   Constitutional: He is oriented to person, place, and time. He appears well-developed. No distress.   HENT:   Head: Normocephalic.   Cardiovascular: Normal rate and regular rhythm.    Pulmonary/Chest: Effort normal. No respiratory distress.   Abdominal: Soft. Bowel sounds are normal. He exhibits no distension. There is no tenderness.   G tube in place without erythema nor drainage   Musculoskeletal: Normal range of motion.   Neurological: He is alert and oriented to person, place, and time.   Skin: Skin is warm and dry. He is not diaphoretic.   Psychiatric: His behavior is normal.       Lab  Lab Results   Component Value Date    WBC 9.20 06/04/2018    HGB 14.6 06/04/2018    MCV 84.4 06/04/2018     06/04/2018       Lab Results   Component Value Date    GLUCOSE 147 (H) 06/04/2018    BUN 18 06/04/2018    CREATININE 1.30 06/04/2018    EGFRIFNONA 54 (L) 06/04/2018    BCR 13.8 06/04/2018    CO2 24.0 06/04/2018    CALCIUM 9.6 06/04/2018    ALBUMIN 4.20 06/04/2018    ALKPHOS 94 06/04/2018    BILITOT 1.0 06/04/2018    ALT 10 06/04/2018    AST 13 06/04/2018       Assessment:    1. Esophageal stricture, s/p recent Wallflex partially covered stent (on 5/31/18 at [facility])  2. Epigastric and chest pain  3. Stage IV Esophageal cancer        Plan:    >>> Recommend discontinuing Dilaudid PCA.  Continue Fentanyl patch and recommend adding Roxicodone elixir for breakthrough.  Will " discuss with Dr. Jiang.    >>> Discontinue Relistor as patient had two bowel movements yesterday on BID Miralax.  If BID Miralax becomes insufficient, will start Relistor  >>> Defer EGD today as patient is tolerating po intake at this time and emesis resolved   >>> BID PPI indefinitely     BRODY Dye  06/06/18  9:37 AM

## 2018-06-06 NOTE — PROGRESS NOTES
[facility] Medicine Services  PROGRESS NOTE    Patient Name: Isaak Benitez  : 1945  MRN: [id number]    Date of Admission: 2018  Length of Stay: 2  Primary Care Physician: Troy Collier MD    Subjective   Subjective     CC:  Pain since Esophageal Stenting    HPI:  Pain improved today (3/10), had dilaudid pca last night but using less now. Tolerating some po. +bm x 2  Review of Systems    Gen- No fevers, chills  Resp- No cough, dyspnea  GI- No N/V/D, abd pain    Otherwise ROS is negative except as mentioned in the HPI.    Objective   Objective     Vital Signs:   Temp:  [98.1 °F (36.7 °C)-99.3 °F (37.4 °C)] 98.1 °F (36.7 °C)  Heart Rate:  [69-77] 77  Resp:  [16-18] 16  BP: (136-148)/(92-97) 146/94     Physical Exam:    Constitutional: No acute distress, awake, alert, on Dilaudid PCA  Eyes: PERRLA, sclerae anicteric, no conjunctival injection  HENT: NCAT, dry tongue  Neck: Supple, no JVD  Respiratory: poor inspiratory effort, clear grossly  Cardiovascular: RRR, s1 and s2  Gastrointestinal: soft, + tenderness to palpation, upper abd incision, + feeding tube epigastic region  Musculoskeletal: No bilateral ankle edema, no clubbing or cyanosis to extremities  Psychiatric: flat affect  Neurologic: Oriented x 3, generalized weakness  Skin: no overt extremity rash note    Results Reviewed:  I have personally reviewed current lab, radiology, and data and agree.      Results from last 7 days  Lab Units 18  0514   WBC 10*3/mm3 9.20   HEMOGLOBIN g/dL 14.6   HEMATOCRIT % 42.6   PLATELETS 10*3/mm3 199       Results from last 7 days  Lab Units 18  0514   SODIUM mmol/L 139   POTASSIUM mmol/L 3.6   CHLORIDE mmol/L 102   CO2 mmol/L 24.0   BUN mg/dL 18   CREATININE mg/dL 1.30   GLUCOSE mg/dL 147*   CALCIUM mg/dL 9.6   ALT (SGPT) U/L 10   AST (SGOT) U/L 13     Estimated Creatinine Clearance: 57.1 mL/min (by C-G formula based on SCr of 1.3 mg/dL).  BNP   Date Value Ref Range Status    06/04/2018 61.0 0.0 - 100.0 pg/mL Final     Comment:     Results may be falsely decreased if patient taking Biotin.     No results found for: PHART    Microbiology Results Abnormal     None          Imaging Results (last 24 hours)     ** No results found for the last 24 hours. **        I have reviewed the medications.    Assessment/Plan   Assessment / Plan     Hospital Problem List     Chest pain    Dysphagia    GERD (gastroesophageal reflux disease)    Malignant neoplasm of esophagus        Brief Hospital Course to date:  Isaak Benitez is a 72 y.o. male with stage IV Esophageal Cancer who has had pain since his 2nd esophageal stenting on May 31 (approx 5 days ago)     Assessment & Plan:    Stage IV Esophageal Cancer with esophageal stent in place  - s/p chemo/radiation  - 2 stents have been placed  - the first one migrated down to stomach  - the second one was placed week prior to admission at [facility]  Chest pain and odynophagia, due to above (improved)  - contrast enhanced CT from last month  - distal esophageal narrowing / thickening  - CT Chest (non contrast enhanced) on admission - stent with clips  Dehydration  - IV fluids  Chronic Kidney Disease  - probable Stage III CKD  Constipation, improved  - bowel regimen on narcotic analgesia  GERD  - on Protonix and Zantac at home  History of PE  - PE Diagnosed in November  - s/p 6 month treatment with Lovneox  Atelectasis  - incentive spirometer      Plan:  -GI Consulted, deferring EGD now due to improvement in pain, and now tolerating po   -continue ppi bid   -d/c'd relistor (now having bowel movements)   -continue bid miralax  -Consult palliative care for assistance in transitioning from dilaudid pca to oral regimen (opioids) and likely benefit from some bzd as well due to anxiety    -bmp, mag in a.m.    -anticipate home tomorrow if continues to tolerate po, and if sx's reasonably controlled on oral pain/anxiety regimen (assistance of palliative  care who has been consulted today)    -follow up w/ dr. Joaquin within next 3-4 weeks for consideration of chemotherapy (taxol/herceptin), and patient wishes to consider whether he wants to pursue this or not    -I discussed plan with patient and wife (on phone) and both appreciate the communication and agreeable to the plan    DVT Prophylaxis:  Lovenox SC    CODE STATUS: Full Code    Disposition: I expect the patient to be discharged tomorrow depending on clinical course      Electronically signed by Alexandro Jiang MD, 06/06/18, 10:18 AM.

## 2018-06-07 VITALS
WEIGHT: 170.86 LBS | SYSTOLIC BLOOD PRESSURE: 132 MMHG | OXYGEN SATURATION: 94 % | RESPIRATION RATE: 16 BRPM | DIASTOLIC BLOOD PRESSURE: 88 MMHG | TEMPERATURE: 98.2 F | BODY MASS INDEX: 22.64 KG/M2 | HEIGHT: 73 IN | HEART RATE: 71 BPM

## 2018-06-07 LAB
ANION GAP SERPL CALCULATED.3IONS-SCNC: 8 MMOL/L (ref 3–11)
BUN BLD-MCNC: 12 MG/DL (ref 9–23)
BUN/CREAT SERPL: 10 (ref 7–25)
CALCIUM SPEC-SCNC: 9 MG/DL (ref 8.7–10.4)
CHLORIDE SERPL-SCNC: 105 MMOL/L (ref 99–109)
CO2 SERPL-SCNC: 25 MMOL/L (ref 20–31)
CREAT BLD-MCNC: 1.2 MG/DL (ref 0.6–1.3)
GFR SERPL CREATININE-BSD FRML MDRD: 60 ML/MIN/1.73
GLUCOSE BLD-MCNC: 132 MG/DL (ref 70–100)
MAGNESIUM SERPL-MCNC: 1.8 MG/DL (ref 1.3–2.7)
POTASSIUM BLD-SCNC: 3.9 MMOL/L (ref 3.5–5.5)
SODIUM BLD-SCNC: 138 MMOL/L (ref 132–146)

## 2018-06-07 PROCEDURE — 83735 ASSAY OF MAGNESIUM: CPT | Performed by: INTERNAL MEDICINE

## 2018-06-07 PROCEDURE — 99239 HOSP IP/OBS DSCHRG MGMT >30: CPT | Performed by: INTERNAL MEDICINE

## 2018-06-07 PROCEDURE — 80048 BASIC METABOLIC PNL TOTAL CA: CPT | Performed by: INTERNAL MEDICINE

## 2018-06-07 PROCEDURE — 25010000002 ENOXAPARIN PER 10 MG

## 2018-06-07 PROCEDURE — 99231 SBSQ HOSP IP/OBS SF/LOW 25: CPT | Performed by: NURSE PRACTITIONER

## 2018-06-07 RX ORDER — TRAZODONE HYDROCHLORIDE 50 MG/1
50 TABLET ORAL NIGHTLY PRN
Qty: 30 TABLET | Refills: 0 | Status: SHIPPED | OUTPATIENT
Start: 2018-06-07

## 2018-06-07 RX ORDER — PSEUDOEPHEDRINE HCL 30 MG
100 TABLET ORAL 2 TIMES DAILY
Qty: 60 CAPSULE | Refills: 0 | Status: SHIPPED | OUTPATIENT
Start: 2018-06-07

## 2018-06-07 RX ORDER — FENTANYL 75 UG/H
1 PATCH TRANSDERMAL
Status: DISCONTINUED | OUTPATIENT
Start: 2018-06-07 | End: 2018-06-07 | Stop reason: HOSPADM

## 2018-06-07 RX ORDER — FENTANYL 50 UG/H
1 PATCH TRANSDERMAL
Qty: 4 PATCH | Refills: 0 | Status: SHIPPED | OUTPATIENT
Start: 2018-06-07 | End: 2018-06-07

## 2018-06-07 RX ORDER — OXYCODONE HYDROCHLORIDE 10 MG/1
10 TABLET ORAL EVERY 4 HOURS PRN
Qty: 42 TABLET | Refills: 0 | Status: SHIPPED | OUTPATIENT
Start: 2018-06-07 | End: 2018-06-07

## 2018-06-07 RX ORDER — MORPHINE SULFATE 15 MG/1
15 TABLET ORAL
Qty: 90 TABLET | Refills: 0
Start: 2018-06-07

## 2018-06-07 RX ORDER — ONDANSETRON 4 MG/1
4 TABLET, FILM COATED ORAL EVERY 6 HOURS PRN
Qty: 15 TABLET | Refills: 0 | Status: SHIPPED | OUTPATIENT
Start: 2018-06-07

## 2018-06-07 RX ORDER — FENTANYL 75 UG/H
1 PATCH TRANSDERMAL
Qty: 4 PATCH | Refills: 0
Start: 2018-06-07 | End: 2018-06-17

## 2018-06-07 RX ORDER — BACLOFEN 10 MG/1
5 TABLET ORAL EVERY 8 HOURS PRN
Qty: 45 TABLET | Refills: 0 | Status: SHIPPED | OUTPATIENT
Start: 2018-06-07

## 2018-06-07 RX ORDER — PANTOPRAZOLE SODIUM 40 MG/1
40 TABLET, DELAYED RELEASE ORAL 2 TIMES DAILY
Qty: 60 TABLET | Refills: 0 | Status: SHIPPED | OUTPATIENT
Start: 2018-06-07

## 2018-06-07 RX ADMIN — SUCRALFATE 1 G: 1 TABLET ORAL at 09:06

## 2018-06-07 RX ADMIN — MORPHINE SULFATE 15 MG: 30 TABLET ORAL at 02:44

## 2018-06-07 RX ADMIN — ENOXAPARIN SODIUM 40 MG: 100 INJECTION SUBCUTANEOUS at 09:06

## 2018-06-07 RX ADMIN — FENTANYL 1 PATCH: 75 PATCH TRANSDERMAL at 11:50

## 2018-06-07 RX ADMIN — DOCUSATE SODIUM 100 MG: 100 CAPSULE, LIQUID FILLED ORAL at 09:06

## 2018-06-07 RX ADMIN — MORPHINE SULFATE 15 MG: 30 TABLET ORAL at 10:39

## 2018-06-07 RX ADMIN — BACLOFEN 5 MG: 10 TABLET ORAL at 09:09

## 2018-06-07 RX ADMIN — MORPHINE SULFATE 15 MG: 30 TABLET ORAL at 06:51

## 2018-06-07 RX ADMIN — POLYETHYLENE GLYCOL (3350) 17 G: 17 POWDER, FOR SOLUTION ORAL at 09:06

## 2018-06-07 RX ADMIN — PANTOPRAZOLE SODIUM 40 MG: 40 TABLET, DELAYED RELEASE ORAL at 09:06

## 2018-06-07 RX ADMIN — OXYCODONE HYDROCHLORIDE 10 MG: 5 TABLET ORAL at 09:09

## 2018-06-07 NOTE — PROGRESS NOTES
"GI Daily Progress Note  Subjective:    Chief Complaint:  I'm ready to go home    Has been able to tolerate regular diet yesterday.  Vomited once yesterday AM, but none since     Intermittent chest pain stable and not worsened.     No longer constipated    Objective:    /88 (BP Location: Right arm, Patient Position: Lying)   Pulse 71   Temp 98.2 °F (36.8 °C) (Oral)   Resp 16   Ht 185.4 cm (73\")   Wt 77.5 kg (170 lb 13.7 oz)   SpO2 94%   BMI 22.54 kg/m²     Physical Exam   Constitutional: He is oriented to person, place, and time. He appears well-developed. No distress.   HENT:   Head: Normocephalic.   Cardiovascular: Normal rate and regular rhythm.    Pulmonary/Chest: Effort normal. No respiratory distress.   Abdominal: Soft. Bowel sounds are normal. He exhibits no distension. There is no tenderness.   G tube in place without erythema nor drainage   Musculoskeletal: Normal range of motion.   Neurological: He is alert and oriented to person, place, and time.   Skin: Skin is warm and dry. He is not diaphoretic.   Psychiatric: His behavior is normal.       Lab  Lab Results   Component Value Date    WBC 9.20 06/04/2018    HGB 14.6 06/04/2018    MCV 84.4 06/04/2018     06/04/2018       Lab Results   Component Value Date    GLUCOSE 132 (H) 06/07/2018    BUN 12 06/07/2018    CREATININE 1.20 06/07/2018    EGFRIFNONA 60 (L) 06/07/2018    BCR 10.0 06/07/2018    CO2 25.0 06/07/2018    CALCIUM 9.0 06/07/2018    ALBUMIN 4.20 06/04/2018    ALKPHOS 94 06/04/2018    BILITOT 1.0 06/04/2018    ALT 10 06/04/2018    AST 13 06/04/2018     Scheduled Meds  docusate sodium 100 mg Oral BID   enoxaparin 40 mg Subcutaneous Q24H   fentaNYL 1 patch Transdermal Q72H   pantoprazole 40 mg Oral Q12H   polyethylene glycol 17 g Oral BID   senna 2 tablet Oral Nightly   sucralfate 1 g Oral 4x Daily AC & at Bedtime   tamsulosin 0.4 mg Oral Nightly   traZODone 50 mg Oral Nightly   Infusions  Pharmacy to Dose enoxaparin (LOVENOX)    PRN " Meds•  ALPRAZolam  •  baclofen  •  haloperidol  •  Morphine  •  naloxone  •  ondansetron  •  ondansetron  •  oxyCODONE  •  Pharmacy to Dose enoxaparin (LOVENOX)  •  sodium chloride  •  sodium chloride  Assessment:    1. Esophageal stricture, s/p recent Wallflex partially covered stent (on 5/31/18 at [facility])  2. Epigastric and chest pain  3. Stage IV Esophageal cancer        Plan:    >>> Continue Fentanyl patch    >>> continue BID Miralax.  If BID Miralax becomes insufficient, will start Relistor  >>> BID PPI indefinitely     Donita Aguilar, APRN  06/07/18  11:52 AM

## 2018-06-07 NOTE — PROGRESS NOTES
"Palliative Care Progress Note    Date of Admission: 6/4/2018    Subjective:  Patient continues to have some spams like pain.  Feels that medicine is helping but not completely resloving  No current facility-administered medications on file prior to encounter.      Current Outpatient Prescriptions on File Prior to Encounter   Medication Sig Dispense Refill   • ALPRAZolam (XANAX) 0.5 MG tablet Take 0.5 mg by mouth Daily.     • raNITIdine (ZANTAC) 150 MG tablet Take 150 mg by mouth Daily.     • tamsulosin (FLOMAX) 0.4 MG capsule 24 hr capsule Take 1 capsule by mouth Every Night.     • [DISCONTINUED] pantoprazole (PROTONIX) 40 MG EC tablet Take 40 mg by mouth Daily.     • aspirin 81 MG tablet Take 81 mg by mouth Daily.     • senna (SENOKOT) 8.6 MG tablet Take 2 tablets by mouth Every Evening.     • sucralfate (CARAFATE) 1 GM/10ML suspension Take 10 mL by mouth 4 (Four) Times a Day. (Patient not taking: Reported on 6/4/2018) 420 mL 5       Pharmacy to Dose enoxaparin (LOVENOX)      •  ALPRAZolam  •  baclofen  •  haloperidol  •  Morphine  •  naloxone  •  ondansetron  •  ondansetron  •  oxyCODONE  •  Pharmacy to Dose enoxaparin (LOVENOX)  •  sodium chloride  •  sodium chloride    Objective: /88 (BP Location: Right arm, Patient Position: Lying)   Pulse 71   Temp 98.2 °F (36.8 °C) (Oral)   Resp 16   Ht 185.4 cm (73\")   Wt 77.5 kg (170 lb 13.7 oz)   SpO2 94%   BMI 22.54 kg/m²      Intake/Output Summary (Last 24 hours) at 06/07/18 1109  Last data filed at 06/07/18 0940   Gross per 24 hour   Intake              300 ml   Output             1300 ml   Net            -1000 ml     Physical Exam:      General Appearance:    Alert, cooperative, in no acute distress   Head:    Normocephalic, without obvious abnormality, atraumatic   Eyes:            Lids and lashes normal, conjunctivae and sclerae normal, no   icterus, no pallor, corneas clear, PERRLA   Ears:    Ears appear intact with no abnormalities noted   Throat:   No " oral lesions, no thrush, oral mucosa moist   Neck:   No adenopathy, supple, trachea midline, no thyromegaly, no   carotid bruit, no JVD   Back:     No kyphosis present, no scoliosis present, no skin lesions,      erythema or scars, no tenderness to percussion or                   palpation,   range of motion normal   Lungs:     Clear to auscultation,respirations regular, even and                  unlabored    Heart:    Regular rhythm and normal rate, normal S1 and S2, no            murmur, no gallop, no rub, no click   Chest Wall:    No abnormalities observed   Abdomen:     Normal bowel sounds, no masses, no organomegaly, soft        non-tender, non-distended, no guarding, no rebound                tenderness   Rectal:     Deferred   Extremities:   Moves all extremities well, no edema, no cyanosis, no             redness   Pulses:   Pulses palpable and equal bilaterally   Skin:   No bleeding, bruising or rash   Lymph nodes:   No palpable adenopathy   Neurologic:   Cranial nerves 2 - 12 grossly intact, sensation intact, DTR       present and equal bilaterally       Results from last 7 days  Lab Units 06/04/18  0514   WBC 10*3/mm3 9.20   HEMOGLOBIN g/dL 14.6   HEMATOCRIT % 42.6   PLATELETS 10*3/mm3 199       Results from last 7 days  Lab Units 06/07/18  0638 06/04/18  0514   SODIUM mmol/L 138 139   POTASSIUM mmol/L 3.9 3.6   CHLORIDE mmol/L 105 102   CO2 mmol/L 25.0 24.0   BUN mg/dL 12 18   CREATININE mg/dL 1.20 1.30   CALCIUM mg/dL 9.0 9.6   BILIRUBIN mg/dL  --  1.0   ALK PHOS U/L  --  94   ALT (SGPT) U/L  --  10   AST (SGOT) U/L  --  13   GLUCOSE mg/dL 132* 147*       Impression: 72 y.o. male with esophageal cancer, stage IV  Plan:   Epigastric pain - Will increase Fentanyl and continue MSIR  Esophageal spasms - started trazodone with multiple effect, SSRI and 5HT2/alpha 1 adrenergic antagonists. Added bedtime, hopefully will assist with discomfort at night.   Prn baclofen, haloperidol.  Patient did not tolerate GI  lei.      Plan - improved symptom management with maintaining functional status.   Recommend follow up with outpt palliative medicine, Dr Claudio. Information provided for follow up appointment.         Edison Patrick DO  06/07/18  11:09 AM

## 2018-06-07 NOTE — PROGRESS NOTES
Case Management Discharge Note    Final Note: Spoke with patient at bedside regarding discharge plan.  Patient dressed and waiting on transport.  No discharge needs noted.  Patient plan is to discharge home today via car with family to transport.     Destination     No service coordination in this encounter.      Durable Medical Equipment     No service coordination in this encounter.      Dialysis/Infusion     No service coordination in this encounter.      Home Medical Care     No service coordination in this encounter.      Social Care     No service coordination in this encounter.             Final Discharge Disposition Code: 01 - home or self-care

## 2018-06-07 NOTE — PLAN OF CARE
Problem: Activity Intolerance (Adult)  Goal: Activity Tolerance  Outcome: Ongoing (interventions implemented as appropriate)    Goal: Effective Energy Conservation Techniques  Outcome: Ongoing (interventions implemented as appropriate)      Problem: Pain, Chronic (Adult)  Goal: Identify Related Risk Factors and Signs and Symptoms  Outcome: Ongoing (interventions implemented as appropriate)    Goal: Acceptable Pain/Comfort Level and Functional Ability  Outcome: Ongoing (interventions implemented as appropriate)      Problem: Palliative Care (Adult)  Goal: Maximized Comfort  Outcome: Ongoing (interventions implemented as appropriate)    Goal: Enhanced Quality of Life  Outcome: Ongoing (interventions implemented as appropriate)      Problem: Patient Care Overview  Goal: Plan of Care Review  Outcome: Ongoing (interventions implemented as appropriate)    Goal: Individualization and Mutuality  Outcome: Ongoing (interventions implemented as appropriate)    Goal: Discharge Needs Assessment  Outcome: Ongoing (interventions implemented as appropriate)

## 2018-06-07 NOTE — NURSING NOTE
Called patient to inform them of the appointment with Dr. Almeida on June 13th at 3 :30pm. Patient requested a different time, I gave them the contact information to Dr. Collier office to change the time.

## 2018-06-07 NOTE — PLAN OF CARE
Problem: Patient Care Overview  Goal: Interprofessional Rounds/Family Conf  Outcome: Outcome(s) achieved Date Met: 06/07/18 06/07/18 1332   Interdisciplinary Rounds/Family Conf   Summary Patient reported symptoms improved, not resolved. Meds adjusted for spasm-like pain, discharged home per pt insistence. Provided Palliative Clinic information, pt to call appointment.   Palliative Team Conference: ANN-MARIE Gutierrez RN, TIESHA; LUZ MARIA Bridges LCSW, [facility]; KENRICK Patrick, ; LIZZY Martinez, RN, TIESHA; SIMONE Sanchez, APRN; LIZZY Eason MDiv, [facility]

## 2018-06-07 NOTE — DISCHARGE SUMMARY
[facility] Medicine Services  DISCHARGE SUMMARY    Patient Name: Isaak Benitez  : 1945  MRN: [id number]    Date of Admission: 2018  Date of Discharge:  2018  Primary Care Physician: Troy Collier MD    Consults     Date and Time Order Name Status Description    2018 0954 Inpatient Palliative Care MD Consult Completed     2018 0837 Inpatient Gastroenterology Consult Completed     2018 0750 Hematology & Oncology Inpatient Consult Completed         Hospital Course     Presenting Problem:   Chest pain [R07.9]  Malignant neoplasm of esophagus, unspecified location [C15.9]    Active Hospital Problems (** Indicates Principal Problem)    Diagnosis Date Noted   • Chest pain [R07.9] 2018   • Dysphagia [R13.10] 2018   • GERD (gastroesophageal reflux disease) [K21.9] 2018   • Malignant neoplasm of esophagus [C15.9] 2018      Resolved Hospital Problems    Diagnosis Date Noted Date Resolved   No resolved problems to display.          Hospital Course:  Isaak Benitez is a 72 y.o. male with stage 4 esophageal cancer with prior esophageal stenting at [facility] 4-5 days prior to this presentation. Was admitted with severe retrosternal pain consistent w/ prior pain related to esophageal stenting, and nausea. CT chest showed esophageal stent in place and no other acute dz. Dr. Joaquin ([facility] onc) and GI were consulted. Was started on dilaudid pca and fentanyl which helped the symptoms. GI deferred endoscopy due to improvement in symptoms and no active bleeding noted. Palliative care was consulted to aid in treating pain/symptoms related to malignancy and the esophageal stent. Was taken off the dilaudid pca and transitioned to oral oxycodone and fentanyl patch, as well as trazodone and baclofen with significant improvement in symptoms. Patient now quite adamant wishes to go home. Plan to discharge home with follow up with palliative care clinic  (dr. Nolvia baltazar), keep follow up with dr. hernandez (heme onc) on 6/20 to discuss future treatment options, and follow up with pcp within a week or so. Patient and wife voice understanding. Additionally, patient instructed to hold oxycodone and fentanyl if overly sedated and they voiced understanding and appreciation for care received this hospitalization.           Day of Discharge     HPI:   Feeling better, tolerating diet and wants to go home today. No dyspnea, pain better    Review of Systems  No fever, no chills    Otherwise ROS is negative except as mentioned in the HPI.    Vital Signs:   Temp:  [98.2 °F (36.8 °C)-99.1 °F (37.3 °C)] 98.2 °F (36.8 °C)  Heart Rate:  [68-78] 71  Resp:  [16-18] 16  BP: (132-149)/() 132/88     Physical Exam:  Constitutional:Alert, oriented x 3, nontoxic appearing  Psych:Normal/appropriate affect  HEENT:Ncat, oroph clear  Neck: neck supple, full range of motion  Neuro: Face symmetric, speech clear, equal , moves all extremities  Cardiac: Rrr; No pretibial pitting edema  Resp: Ctab, normal effort  GI: abd soft, nontender  Skin: No extremity rash  Musculoskeletal/extremities: no cyanosis extremities; no significant ankle edema            Pertinent  and/or Most Recent Results       Results from last 7 days  Lab Units 06/07/18  0638 06/04/18  0514   WBC 10*3/mm3  --  9.20   HEMOGLOBIN g/dL  --  14.6   HEMATOCRIT %  --  42.6   PLATELETS 10*3/mm3  --  199   SODIUM mmol/L 138 139   POTASSIUM mmol/L 3.9 3.6   CHLORIDE mmol/L 105 102   CO2 mmol/L 25.0 24.0   BUN mg/dL 12 18   CREATININE mg/dL 1.20 1.30   GLUCOSE mg/dL 132* 147*   CALCIUM mg/dL 9.0 9.6       Results from last 7 days  Lab Units 06/04/18  0514   BILIRUBIN mg/dL 1.0   ALK PHOS U/L 94   ALT (SGPT) U/L 10   AST (SGOT) U/L 13           Invalid input(s): TG, LDLCALC, LDLREALC    Results from last 7 days  Lab Units 06/04/18  0514   BNP pg/mL 61.0     Brief Urine Lab Results  (Last result in the past 365 days)      Color    Clarity   Blood   Leuk Est   Nitrite   Protein   CREAT   Urine HCG        06/05/18 0559 Yellow Clear Negative Negative Negative Negative               Microbiology Results Abnormal     None          Imaging Results (all)     Procedure Component Value Units Date/Time    CT Chest Without Contrast [[id number]] Collected:  06/04/18 1041     Updated:  06/04/18 1452    Narrative:       EXAMINATION: CT CHEST WO CONTRAST-06/04/2018:      INDICATION: Recent esophageal stent placement.  Significant CP since  placement.; C15.9-Malignant neoplasm of esophagus, unspecified;  R07.9-Chest pain, unspecified.      TECHNIQUE: Noncontrast CT scan of the chest.     The radiation dose reduction device was turned on for each scan per the  ALARA (As Low as Reasonably Achievable) protocol.     COMPARISON: 05/09/2018.     FINDINGS: Esophageal stent and gastrostomy tube are visualized. There  are two 1 cm long densities at the proximal aspect of the esophageal  stent. There are small bilateral pleural effusions. No mediastinal or  pleural air is seen. There is dependent atelectasis adjacent to the  effusions. There is no focal airspace disease, noncalcified nodule or  mass. The central airways are patent. The heart size is within normal  limits. Atherosclerotic vascular calcifications are seen. No adenopathy  is identified within the limits of a noncontrast scan. Incidental  imaging of the upper abdomen shows a small amount of perihepatic free  fluid and bilateral renal hypodensities most likely representing cysts.       Impression:       1.  Two metallic densities possibly lodged at the proximal stent or,  alternatively, may be part of the stent itself.  2. Small effusions.     D:  06/04/2018  E:  06/04/2018           This report was finalized on 6/4/2018 2:50 PM by Eugene Hunt.       XR Chest 1 View [[id number]] Collected:  06/04/18 0506     Updated:  06/04/18 0611    Narrative:       EXAM:    XR Chest, 1 View    EXAM DATE/TIME:    6/4/2018  5:06 AM    CLINICAL HISTORY:    72 years old, male; Pain; Chest pain; Type not specified; Additional info:   Chest pain triage protocol    TECHNIQUE:    Frontal view of the chest.    COMPARISON:    CT CHEST W CONTRAST 2018-05-09 13:15    FINDINGS:    Lungs:  Discoid atelectasis in lung bases. No consolidation or vascular   congestion.    Pleural space:  No large pleural effusion.  No pneumothorax.    Heart:  Unremarkable.  No cardiomegaly.    Mediastinum:  Unremarkable.    Bones/joints:  No acute abnormality.    Tubes, lines and devices:  Percutaneous gastrostomy tube with pigtail at   gastroduodenal junction. Distal esophageal stent.      Impression:         Bibasilar discoid atelectases.    THIS DOCUMENT HAS BEEN ELECTRONICALLY SIGNED BY MAGALI ROB MD                           Discharge Details      Isaak Benitez   Home Medication Instructions ADRIENNE:[id number]    Printed on:06/07/18 0072   Medication Information                      ALPRAZolam (XANAX) 0.5 MG tablet  Take 0.5 mg by mouth Daily.             aspirin 81 MG tablet  Take 81 mg by mouth Daily.             baclofen (LIORESAL) 10 MG tablet  Take 0.5 tablets by mouth Every 8 (Eight) Hours As Needed for Muscle Spasms (Esophageal spasm).             docusate sodium 100 MG capsule  Take 100 mg by mouth 2 (Two) Times a Day.             fentaNYL (DURAGESIC) 50 MCG/HR patch  Place 1 patch on the skin Every 72 (Seventy-Two) Hours for 4 doses.             ondansetron (ZOFRAN) 4 MG tablet  Take 1 tablet by mouth Every 6 (Six) Hours As Needed for Nausea or Vomiting.             oxyCODONE (ROXICODONE) 10 MG tablet  Take 1 tablet by mouth Every 4 (Four) Hours As Needed for Severe Pain  for up to 42 doses.             pantoprazole (PROTONIX) 40 MG EC tablet  Take 1 tablet by mouth 2 (Two) Times a Day.             polyethylene glycol (MIRALAX) pack packet  Take 17 g by mouth 2 (Two) Times a Day As Needed (constipation).             senna (SENOKOT) 8.6 MG tablet  Take 2  tablets by mouth Every Evening.             sucralfate (CARAFATE) 1 GM/10ML suspension  Take 10 mL by mouth 4 (Four) Times a Day.             tamsulosin (FLOMAX) 0.4 MG capsule 24 hr capsule  Take 1 capsule by mouth Every Night.             traZODone (DESYREL) 50 MG tablet  Take 1 tablet by mouth At Night As Needed for Sleep.                   Discharge Disposition:  Home or Self Care    Discharge Diet:  regular    Discharge Activity:   ad gissell    Future Appointments  Date Time Provider Department Center   6/20/2018 1:30 PM Mirta Tenorio MD [facility]       Additional Instructions for the Follow-ups that You Need to Schedule     Discharge Follow-up with PCP    As directed      Follow Up Details:  1 week         Discharge Follow-up with Specialty: keep follow up w/ dr. hernandez ([facility]) as scheduled on 6/20    As directed      Specialty:  keep follow up w/ dr. hernandez ([facility]) as scheduled on 6/20         Discharge Follow-up with Specialty: palliative care clinic w/ dr. cammy baltazar 1 week or 1st available    As directed      Specialty:  palliative care clinic w/ dr. cammy baltazar 1 week or 1st available         Discharge Follow-up with Specified Provider: dr. hernandez ([facility]) as scheduled 6/20/18    As directed      To:  dr. hernandez ([facility] onc) as scheduled 6/20/18         Discharge Follow-up with Specified Provider: palliative care outpatient clinic dr. cammy frederick 1 weeks or first available    As directed      To:  palliative care outpatient clinic dr. cammy frederick 1 weeks or first available               Time Spent on Discharge:  40 min spent on discharge    Electronically signed by Alexandro Jiang MD, 06/07/18, 10:43 AM.

## 2018-06-07 NOTE — PLAN OF CARE
Problem: Activity Intolerance (Adult)  Goal: Identify Related Risk Factors and Signs and Symptoms  Outcome: Outcome(s) achieved Date Met: 06/07/18    Goal: Activity Tolerance  Outcome: Ongoing (interventions implemented as appropriate)    Goal: Effective Energy Conservation Techniques  Outcome: Ongoing (interventions implemented as appropriate)      Problem: Pain, Chronic (Adult)  Goal: Identify Related Risk Factors and Signs and Symptoms  Outcome: Ongoing (interventions implemented as appropriate)    Goal: Acceptable Pain/Comfort Level and Functional Ability  Outcome: Ongoing (interventions implemented as appropriate)      Problem: Palliative Care (Adult)  Goal: Identify Related Risk Factors and Signs and Symptoms  Outcome: Outcome(s) achieved Date Met: 06/07/18    Goal: Maximized Comfort  Outcome: Ongoing (interventions implemented as appropriate)    Goal: Enhanced Quality of Life  Outcome: Ongoing (interventions implemented as appropriate)      Problem: Patient Care Overview  Goal: Plan of Care Review  Outcome: Ongoing (interventions implemented as appropriate)   06/07/18 0442   Plan of Care Review   Progress improving   OTHER   Outcome Summary VSS, pain controlled with prn meds, rested well this shift, no other issues/concerns     Goal: Discharge Needs Assessment  Outcome: Ongoing (interventions implemented as appropriate)

## 2018-06-08 ENCOUNTER — TELEPHONE (OUTPATIENT)
Dept: GASTROENTEROLOGY | Facility: CLINIC | Age: 73
End: 2018-06-08

## 2018-06-08 RX ORDER — PROMETHAZINE HYDROCHLORIDE 25 MG/1
25 TABLET ORAL EVERY 6 HOURS PRN
Qty: 30 TABLET | Refills: 0 | Status: SHIPPED | OUTPATIENT
Start: 2018-06-08

## 2018-06-08 NOTE — TELEPHONE ENCOUNTER
Patient called stated that he has been sick since leaving the hospital. Patient complains of N/V and states that he even vomited in hospital parking lot before leaving. Please advise.

## 2018-06-08 NOTE — TELEPHONE ENCOUNTER
I will contact the patient.  He needs to be directly admitted to the hospital today.  I will arrange.    Thank you.

## 2018-06-08 NOTE — TELEPHONE ENCOUNTER
Contacted patient regarding his call for nausea and vomiting.  He reports vomiting in the parking lot after leaving the hospital.  He had further episodes of emesis last night and one at 8 am this morning.  He is taking PRN Zofran.  He ate some of his breakfast and had emesis again.  He is tolerating liquids by mouth.    I discussed options of directly admitting him to the hospital for EGD and patient declines.    Instructed patient to take scheduled Zofran today every six hours.  Will prescribed Phenergan as well to his home pharmacy.    He is to contact us if has any changes or worsening.

## 2018-06-12 ENCOUNTER — TELEPHONE (OUTPATIENT)
Dept: ONCOLOGY | Facility: CLINIC | Age: 73
End: 2018-06-12

## 2018-06-12 NOTE — ED PROVIDER NOTES
Subjective   Pt has a history of esophageal cancer and had an esophageal stent placed on 5/31.  Immediately after the stent placement he began experiencing significant pain in his upper chest which has not subsided. He denies fever, chills, or other associatd symptoms.            Review of Systems   All other systems reviewed and are negative.      Past Medical History:   Diagnosis Date   • Cancer     esophogeal dx'd OCT 2017   • Kidney stones        No Known Allergies    Past Surgical History:   Procedure Laterality Date   • CATARACT EXTRACTION Bilateral     DR CHAMBERLAIN [place]   • KNEE SURGERY Left     [facility]   • SHOULDER SURGERY Bilateral     DR REJI SCOTT [place]       Family History   Problem Relation Age of Onset   • Stroke Mother    • Heart attack Father    • Heart attack Sister        Social History     Social History   • Marital status:      Social History Main Topics   • Smoking status: Former Smoker     Quit date: 1980   • Smokeless tobacco: Never Used   • Alcohol use No   • Drug use: No   • Sexual activity: Defer     Other Topics Concern   • Not on file           Objective   Physical Exam   Constitutional: He is oriented to person, place, and time. No distress.   Pt appears uncomfortable   HENT:   Head: Normocephalic and atraumatic.   Eyes: Conjunctivae and EOM are normal. Pupils are equal, round, and reactive to light.   Neck: Normal range of motion. Neck supple. No thyromegaly present.   Cardiovascular: Normal rate, regular rhythm and normal heart sounds.  Exam reveals no gallop and no friction rub.    No murmur heard.  Pulmonary/Chest: Effort normal and breath sounds normal. No respiratory distress.   Abdominal: Soft. Bowel sounds are normal. There is no tenderness.   G tube in place   Musculoskeletal: Normal range of motion.   Lymphadenopathy:     He has no cervical adenopathy.   Neurological: He is alert and oriented to person, place, and time.   Skin: Skin is warm and dry.   Psychiatric: He has  a normal mood and affect.   Nursing note and vitals reviewed.      Procedures           ED Course        Results for DANNA CALDWELL (MRN [id number]) as of 6/12/2018 15:37   Ref. Range 6/4/2018 05:14   BNP Latest Ref Range: 0.0 - 100.0 pg/mL 61.0   Glucose Latest Ref Range: 70 - 100 mg/dL 147 (H)   Sodium Latest Ref Range: 132 - 146 mmol/L 139   Potassium Latest Ref Range: 3.5 - 5.5 mmol/L 3.6   CO2 Latest Ref Range: 20.0 - 31.0 mmol/L 24.0   Chloride Latest Ref Range: 99 - 109 mmol/L 102   Anion Gap Latest Ref Range: 3.0 - 11.0 mmol/L 13.0 (H)   Creatinine Latest Ref Range: 0.60 - 1.30 mg/dL 1.30   BUN Latest Ref Range: 9 - 23 mg/dL 18   BUN/Creatinine Ratio Latest Ref Range: 7.0 - 25.0  13.8   Calcium Latest Ref Range: 8.7 - 10.4 mg/dL 9.6   eGFR Non African Amer Latest Ref Range: >60 mL/min/1.73 54 (L)   Alkaline Phosphatase Latest Ref Range: 25 - 100 U/L 94   Total Protein Latest Ref Range: 5.7 - 8.2 g/dL 7.1   ALT (SGPT) Latest Ref Range: 7 - 40 U/L 10   AST (SGOT) Latest Ref Range: 0 - 33 U/L 13   Total Bilirubin Latest Ref Range: 0.3 - 1.2 mg/dL 1.0   Albumin Latest Ref Range: 3.20 - 4.80 g/dL 4.20   Globulin Latest Units: gm/dL 2.9   A/G Ratio Latest Ref Range: 1.5 - 2.5 g/dL 1.4 (L)   Lipase Latest Ref Range: 6 - 51 U/L 24   WBC Latest Ref Range: 3.50 - 10.80 10*3/mm3 9.20   RBC Latest Ref Range: 4.20 - 5.76 10*6/mm3 5.05   Hemoglobin Latest Ref Range: 13.1 - 17.5 g/dL 14.6   Hematocrit Latest Ref Range: 38.9 - 50.9 % 42.6   RDW Latest Ref Range: 11.3 - 14.5 % 13.6   MCV Latest Ref Range: 80.0 - 99.0 fL 84.4   MCH Latest Ref Range: 27.0 - 31.0 pg 28.9   MCHC Latest Ref Range: 32.0 - 36.0 g/dL 34.3   MPV Latest Ref Range: 6.0 - 12.0 fL 10.1   Platelets Latest Ref Range: 150 - 450 10*3/mm3 199   RDW-SD Latest Ref Range: 37.0 - 54.0 fl 40.9   Neutrophil % Latest Ref Range: 41.0 - 71.0 % 82.0 (H)   Lymphocyte % Latest Ref Range: 24.0 - 44.0 % 7.7 (L)   Monocyte % Latest Ref Range: 0.0 - 12.0 % 8.3   Eosinophil  % Latest Ref Range: 0.0 - 3.0 % 1.8   Basophil % Latest Ref Range: 0.0 - 1.0 % 0.2   Immature Grans % Latest Ref Range: 0.0 - 0.6 % 0.2   Neutrophils, Absolute Latest Ref Range: 1.50 - 8.30 10*3/mm3 7.54   Lymphocytes, Absolute Latest Ref Range: 0.60 - 4.80 10*3/mm3 0.71   Monocytes, Absolute Latest Ref Range: 0.00 - 1.00 10*3/mm3 0.76   Eosinophils, Absolute Latest Ref Range: 0.00 - 0.30 10*3/mm3 0.17   Basophils, Absolute Latest Ref Range: 0.00 - 0.20 10*3/mm3 0.02   Immature Grans, Absolute Latest Ref Range: 0.00 - 0.03 10*3/mm3 0.02     Pt is followed by Dr. Joaquin. I discussed the case with oncology.  Recommneded admission for pain control and possible stent removal.  Please see their note for details.  Pt and familiy are agreeable with this plan.          MDM      Final diagnoses:   Malignant neoplasm of esophagus, unspecified location   Chest pain, unspecified type            Netovic Fox,   06/12/18 4785

## 2018-06-12 NOTE — TELEPHONE ENCOUNTER
Returned call to wife and she reports that patient hasn't had much to eat or drink and legs are jerking.  She did report that she is giving him water through [employer] but feels that isn't enough.  Discussed with wife to call clinic earlier that we could possibly gotten patient into infusion for IVF.  Wife is going to take patient to ER to be assessed for need of fluids.

## 2018-06-12 NOTE — TELEPHONE ENCOUNTER
----- Message from Sera Abdi sent at 6/12/2018  4:02 PM EDT -----  Regarding: [facility] - DEHYDRATED   Contact: [phone]  ARTHUR CALDWELL, SPOUSE CALLED AND THINKS HE IS DEHYDRATED BECAUSE HE IS WEAK, AND LEGS JERKING. HE HASN'T BEEN ABLE TO EAT FOR A COUPLE OF DAYS. SHOULD THEY TAKE HIM TO LOCAL HOSPITAL OR BRING HIM TO [facility]?

## 2018-06-20 ENCOUNTER — OFFICE VISIT (OUTPATIENT)
Dept: ONCOLOGY | Facility: CLINIC | Age: 73
End: 2018-06-20

## 2018-06-20 VITALS
SYSTOLIC BLOOD PRESSURE: 111 MMHG | WEIGHT: 169 LBS | DIASTOLIC BLOOD PRESSURE: 78 MMHG | RESPIRATION RATE: 16 BRPM | TEMPERATURE: 97.6 F | HEART RATE: 70 BPM | HEIGHT: 73 IN | BODY MASS INDEX: 22.4 KG/M2

## 2018-06-20 DIAGNOSIS — C15.9 ESOPHAGEAL CANCER, STAGE IV (HCC): Primary | ICD-10-CM

## 2018-06-20 PROCEDURE — 99214 OFFICE O/P EST MOD 30 MIN: CPT | Performed by: INTERNAL MEDICINE

## 2018-06-20 NOTE — PROGRESS NOTES
"PROBLEM LIST:    1.  Metastatic adenocarcinoma of the esophagus: Patient was treated with chemotherapy and radiation concurrently at [facility] in December 2017.  2.  Post neoadjuvant therapy esophagectomy was aborted due to metastases noted in the diaphragm.  3.  Dysphasia secondary to likely stricture versus recurrent disease  4.  HER-2 positive by fish      REASON FOR VISIT: The management of my esophageal cancer     HISTORY OF PRESENT ILLNESS:   72 y.o.  male presents today for follow-up of his esophageal cancer.  The pain from a stent has improved nicely.  Clinically he is doing well.  He is opting not to have any chemotherapy.  Prefer a palliative approach and hospice.    Past medical history, social history and family history was reviewed and unchanged from prior visit.    Review of Systems:    Review of Systems   Constitutional: Positive for appetite change.   HENT:  Negative.    Eyes: Negative.    Respiratory: Negative.    Cardiovascular: Negative.    Gastrointestinal: Negative.    Endocrine: Negative.    Genitourinary: Negative.     Musculoskeletal: Negative.    Skin: Negative.    Neurological: Negative.    Hematological: Negative.    Psychiatric/Behavioral: Negative.       A comprehensive 14 point review of systems was performed and was negative except as mentioned.      Medications:  The current medication list was reviewed in the EMR    ALLERGIES:  No Known Allergies      Physical Exam    VITAL SIGNS:  /78 Comment: LUE  Pulse 70   Temp 97.6 °F (36.4 °C) (Temporal Artery )   Resp 16   Ht 185.4 cm (73\")   Wt 76.7 kg (169 lb)   BMI 22.30 kg/m²     Wt Readings from Last 3 Encounters:   06/20/18 76.7 kg (169 lb)   06/04/18 77.5 kg (170 lb 13.7 oz)   05/09/18 78.1 kg (172 lb 3.2 oz)        Performance Status: 0    General: well appearing, in no acute distress  HEENT: sclera anicteric, oropharynx clear, neck is supple  Lymphatics: no cervical, supraclavicular, or axillary " adenopathy  Cardiovascular: regular rate and rhythm, no murmurs, rubs or gallops  Lungs: clear to auscultation bilaterally  Abdomen: soft, nontender, nondistended.  No palpable organomegaly, G-Tube in place  Extremities: no lower extremity edema  Skin: no rashes, lesions, bruising, or petechiae  Msk:  Shows no weakness of the large muscle groups  Psych: Mood is stable    Ct Chest With Contrast    Result Date: 5/9/2018  There is thickening of the distal third of the esophagus with luminal narrowing. There is no evidence of mediastinal adenopathy, pericardial/pleural effusion. There are changes in the lung bases which are thought to be chronic postinflammatory changes.  D:  05/09/2018 E:  05/09/2018    This report was finalized on 5/9/2018 2:20 PM by Dr. John Pang MD.      Ct Abdomen Pelvis With Contrast    Result Date: 5/9/2018  There are no malignant findings noted in the abdomen or pelvis. There is a nonspecific moderate amount of pelvic fluid. There is pancolonic diverticulosis and a gastrostomy tube is noted.  D:  05/09/2018 E:  05/09/2018  This report was finalized on 5/9/2018 2:56 PM by Dr. John Pang MD.          Assessment/Plan    1.  Metastatic HER-2 positive esophageal cancer: He has decided against chemotherapy.  He would prefer to go forward with hospice and I will arrange for them to meet him at his home tomorrow.    2.  Dysphagia secondary to a distal stricture.  This has improved since I last saw him due to the stent.       I spent 25 minutes on the patient's plan and care with more than 50% of the time spent counseling the patient.        Mirta Tenorio MD  [facility]    6/20/2018     Return in (Approximately): PRN        Orders Placed This Encounter   Procedures   • Ambulatory Referral to Home Hospice     Referral Priority:   Routine     Referral Type:   Hospice     Referral Reason:   Specialty Services Required     Requested Specialty:   Hospice Services      Number of Visits Requested:   1           Please note that portions of this note may have been completed with a voice recognition program. Efforts were made to edit the dictations, but occasionally words are mistranscribed.

## 2018-06-22 ENCOUNTER — TELEPHONE (OUTPATIENT)
Dept: ONCOLOGY | Facility: CLINIC | Age: 73
End: 2018-06-22

## 2018-06-22 NOTE — TELEPHONE ENCOUNTER
----- Message from Lissett Wall sent at 6/21/2018 11:37 AM EDT -----  Regarding: [facility]  Contact: [phone]  Kecia with hospice called she has questions about paperwork faxed over yesterday about him being admitted. Please call.

## 2018-06-22 NOTE — TELEPHONE ENCOUNTER
Return call to Kecia with hospice.  Her question was if patient was home or inpatient.  Reported that patient is at home and ready for call to be admitted.

## 2018-06-26 ENCOUNTER — TELEPHONE (OUTPATIENT)
Dept: ONCOLOGY | Facility: CLINIC | Age: 73
End: 2018-06-26

## 2018-06-26 NOTE — TELEPHONE ENCOUNTER
Otilia from [facility] called and reported that she went out to admit patient and wife is having some difficulty with patient being admitted to hospice related to she fells that she is giving up, and Otilia asked if I would call patient and his wife.  I called and discussed hospice and what it means for comfort and quality of life.  Wife was crying through out conversation and reported that she feels that she is giving up.  I educated that it was completely up to patient and family on the decision to be admitted to hospice or to continue to follow with PCP and Dr Joaquin.  Wife was concern about the possible need of fluids.  Educated that patient can take fluids through g tube for hydration if unable to take in fluids by mouth.  Wife did thank me for calling and wasn't sure about keeping hospice.  She did say that treatment was not an option for patient and verbalized understanding that he had 6 month or less to live.  Educated that patient and/or wife can call clinic with any further questions.